# Patient Record
Sex: FEMALE | HISPANIC OR LATINO | Employment: PART TIME | URBAN - METROPOLITAN AREA
[De-identification: names, ages, dates, MRNs, and addresses within clinical notes are randomized per-mention and may not be internally consistent; named-entity substitution may affect disease eponyms.]

---

## 2023-05-30 ENCOUNTER — OFFICE VISIT (OUTPATIENT)
Dept: URGENT CARE | Facility: URGENT CARE | Age: 63
End: 2023-05-30
Payer: COMMERCIAL

## 2023-05-30 VITALS
DIASTOLIC BLOOD PRESSURE: 82 MMHG | RESPIRATION RATE: 20 BRPM | OXYGEN SATURATION: 95 % | HEART RATE: 74 BPM | WEIGHT: 199.9 LBS | TEMPERATURE: 98.7 F | SYSTOLIC BLOOD PRESSURE: 117 MMHG

## 2023-05-30 DIAGNOSIS — H11.422 CHEMOSIS OF LEFT CONJUNCTIVA: Primary | ICD-10-CM

## 2023-05-30 DIAGNOSIS — H02.846 SWELLING OF EYELID, LEFT: ICD-10-CM

## 2023-05-30 PROCEDURE — 99204 OFFICE O/P NEW MOD 45 MIN: CPT | Performed by: PHYSICIAN ASSISTANT

## 2023-05-30 NOTE — PROGRESS NOTES
SUBJECTIVE:  Chief Complaint:   Chief Complaint   Patient presents with     Eye Problem     Left eye swollen drainage woke up with it     History of Present Illness:  Tiffanie Pozo is a 63 year old female who presents complaining of moderate left eye discharge, mattering, pain, redness, edema, eyelid swelling, and chemosis for 1 day(s).   Onset/timing: gradual.    Associated Signs and Symptoms: none      No past medical history on file.  No current outpatient medications on file.        ROS:  Review of systems negative except as stated above.    OBJECTIVE:  /82 (BP Location: Right arm, Patient Position: Sitting, Cuff Size: Adult Large)   Pulse 74   Temp 98.7  F (37.1  C) (Oral)   Resp 20   Wt 90.7 kg (199 lb 14.4 oz)   SpO2 95%   General: no acute distress  Eye exam: right eye normal lid, conjunctiva, cornea, pupil and fundus, left eye abnormal findings: conjunctivitis with erythema, discharge and matting noted, periorbital edema.  Lungs: no labored breathing    No results found for any visits on 05/30/23.      ASSESSMENT:  (H11.422) Chemosis of left conjunctiva  (primary encounter diagnosis)  (H02.846) Swelling of eyelid, left  Comment: pain and significant swelling  Plan: to ED for assessment

## 2023-08-10 ENCOUNTER — OFFICE VISIT (OUTPATIENT)
Dept: URGENT CARE | Facility: URGENT CARE | Age: 63
End: 2023-08-10
Payer: COMMERCIAL

## 2023-08-10 VITALS
RESPIRATION RATE: 20 BRPM | HEART RATE: 78 BPM | OXYGEN SATURATION: 98 % | DIASTOLIC BLOOD PRESSURE: 74 MMHG | TEMPERATURE: 98 F | SYSTOLIC BLOOD PRESSURE: 110 MMHG

## 2023-08-10 DIAGNOSIS — M76.31 ILIOTIBIAL BAND SYNDROME OF RIGHT SIDE: Primary | ICD-10-CM

## 2023-08-10 PROCEDURE — 99213 OFFICE O/P EST LOW 20 MIN: CPT | Performed by: PHYSICIAN ASSISTANT

## 2023-08-10 RX ORDER — METHYLPREDNISOLONE 4 MG
TABLET, DOSE PACK ORAL
Qty: 21 TABLET | Refills: 0 | Status: SHIPPED | OUTPATIENT
Start: 2023-08-10 | End: 2023-11-28

## 2023-08-10 NOTE — PROGRESS NOTES
Assessment & Plan     1. Iliotibial band syndrome of right side  I suspect that patients symptoms are secondary to IT band syndrome, with some pain in her hip flexor as well.  Steroid taper, given the tingling and radiating pain without improvement with OTC medications.   Follow-up with ortho in one week if symptoms are not improving as expected.   - Orthopedic  Referral; Future  - methylPREDNISolone (MEDROL DOSEPAK) 4 MG tablet therapy pack; Follow Package Directions  Dispense: 21 tablet; Refill: 0        Return in about 3 days (around 8/13/2023), or if symptoms worsen or fail to improve.    Diagnosis and treatment plan was reviewed with patient and/or family.   We went over any labs or imaging. Discussed worsening symptoms or little to no relief despite treatment plan to follow-up with PCP or return to clinic.  Patient verbalizes understanding. All questions were addressed and answered.     Lavonne Washington PA-C  [facility] [place]    CHIEF COMPLAINT:   Chief Complaint   Patient presents with    Musculoskeletal Problem     R leg pain no injury pt is having a hard time walking      Luz Moreno is a 63 year old female who presents to clinic today for evaluation of right leg pain. Symptoms started about one  month ago and are slowly worsening. Pain radiates from her hip, down the lateral aspect of her leg. Pain is made worse with standing and walking. She has pain in her hip flexor as well. She has had some tingling down her leg.     Patient denies fever, chills, fatigue, weight loss, fecal or urinary incontinence, lower extremity numbness or tingling, or lower extremity weakness.          History reviewed. No pertinent past medical history.  History reviewed. No pertinent surgical history.  Social History     Tobacco Use    Smoking status: Never    Smokeless tobacco: Never   Substance Use Topics    Alcohol use: Not on file     Current Outpatient Medications   Medication     methylPREDNISolone (MEDROL DOSEPAK) 4 MG tablet therapy pack     No current facility-administered medications for this visit.     No Known Allergies    10 point ROS of systems were all negative except for pertinent positives noted in my HPI.      Exam:   /74   Pulse 78   Temp 98  F (36.7  C) (Tympanic)   Resp 20   SpO2 98%   Gen: healthy,alert,no distress  Extremity: Left leg has tenderness to palpation over the IT band. She does not have TTP over hip, in groin or abdomen. Strength and sensation are intact B/L  There is not compromise to the distal circulation.  Pulses are +2 and CRT is brisk  EXTREMITIES: peripheral pulses normal  SKIN: no suspicious lesions or rashes  NEURO: Normal strength and tone, sensory exam grossly normal, mentation intact and speech normal    No results found for any visits on 08/10/23.

## 2023-08-10 NOTE — PROGRESS NOTES
"Assessment & Plan     There are no diagnoses linked to this encounter.  Not in the location of deep vein.    No follow-ups on file.    Diagnosis and treatment plan was reviewed with patient and/or family.   We went over any labs or imaging. Discussed worsening symptoms or little to no relief despite treatment plan to follow-up with PCP or return to clinic.  Patient verbalizes understanding. All questions were addressed and answered.     KAYLA Sarmiento [facility] [place]    CHIEF COMPLAINT:   Chief Complaint   Patient presents with    Musculoskeletal Problem     R leg pain no injury pt is having a hard time walking      Subjective     Tiffanie is a 63 year old female who presents to clinic today for evaluation of right leg pain from the hip all the way down to the ankle. Feels like a cramp and is more painful with walking. Symptoms have been present for the past month.   She has taken OTC pain medication for her pain.   She has not had any back pain.     She has not noticed any redness or swelling.   No recent travel or surgery.   Patient denies having fever, chills, numbness, pale or cold extremity.       History reviewed. No pertinent past medical history.  History reviewed. No pertinent surgical history.  Social History     Tobacco Use    Smoking status: Never    Smokeless tobacco: Never   Substance Use Topics    Alcohol use: Not on file     No current outpatient medications on file.     No current facility-administered medications for this visit.     No Known Allergies    10 point ROS of systems were all negative except for pertinent positives noted in my HPI.      Exam:   /74   Pulse 78   Temp 98  F (36.7  C) (Tympanic)   Resp 20   SpO2 98%   Gen: {GENERAL APPEARANCE:50::\"healthy,alert,no distress\"}  Extremity: {BODY PART:900730} has {EXAM INJURY:259139}.   She has tenderness at the IT band insertion  There {IS/IS NOT:501992::\"is\"} compromise to the distal circulation.  Pulses " "are +2 and CRT is brisk  {EXAM NORMAL:153291::\"GENERAL APPEARANCE: healthy, alert and no distress\",\"EXTREMITIES: peripheral pulses normal\",\"SKIN: no suspicious lesions or rashes\",\"NEURO: Normal strength and tone, sensory exam grossly normal, mentation intact and speech normal\"}    No results found for any visits on 08/10/23.            "

## 2023-11-03 ENCOUNTER — ANCILLARY PROCEDURE (OUTPATIENT)
Dept: GENERAL RADIOLOGY | Facility: CLINIC | Age: 63
End: 2023-11-03
Attending: PHYSICIAN ASSISTANT
Payer: COMMERCIAL

## 2023-11-03 ENCOUNTER — OFFICE VISIT (OUTPATIENT)
Dept: ORTHOPEDICS | Facility: CLINIC | Age: 63
End: 2023-11-03
Attending: PHYSICIAN ASSISTANT
Payer: COMMERCIAL

## 2023-11-03 VITALS — WEIGHT: 216.49 LBS

## 2023-11-03 DIAGNOSIS — M16.11 PRIMARY OSTEOARTHRITIS OF RIGHT HIP: Primary | ICD-10-CM

## 2023-11-03 DIAGNOSIS — M79.604 RIGHT LEG PAIN: ICD-10-CM

## 2023-11-03 DIAGNOSIS — M76.31 ILIOTIBIAL BAND SYNDROME OF RIGHT SIDE: ICD-10-CM

## 2023-11-03 PROCEDURE — 73502 X-RAY EXAM HIP UNI 2-3 VIEWS: CPT | Mod: TC | Performed by: RADIOLOGY

## 2023-11-03 PROCEDURE — 99205 OFFICE O/P NEW HI 60 MIN: CPT | Performed by: PHYSICIAN ASSISTANT

## 2023-11-03 PROCEDURE — 72100 X-RAY EXAM L-S SPINE 2/3 VWS: CPT | Mod: TC | Performed by: RADIOLOGY

## 2023-11-03 RX ORDER — MELOXICAM 7.5 MG/1
15 TABLET ORAL 2 TIMES DAILY
Qty: 30 TABLET | Refills: 0 | Status: SHIPPED | OUTPATIENT
Start: 2023-11-03 | End: 2023-11-25 | Stop reason: SINTOL

## 2023-11-03 NOTE — PATIENT INSTRUCTIONS
Today we discussed the underlying etiology/pathology of patient's   1. Primary osteoarthritis of right hip    2. Right leg pain      -At this time I had a michael discussion with the patient through  that her physical exam and history are difficult to interpret.  - Patient does have degenerative changes of the right hip on x-ray with pincer type anatomy and does have discomfort with internal rotation and impingement testing generating right groin pain as well as some right proximal hemipelvic pain.  - Patient also is tender to palpation through the greater trochanter and the IT band laterally  - I cannot explain patient's pulsating statements involving the right lateral calf and cannot rule out lumbar radiculopathy as a possible differential diagnosis  - For diagnostic and therapeutic purposes I will send her for intra-articular cortisone injection of the right hip under advanced imaging.  Patient was instructed to be attention to the anesthetic portion for the first 6 to 12 hours as well as the cortisone effect over the next 2 weeks status post injection.  I will see her back 2 weeks after injection to determine therapeutic response  - Meloxicam was refilled today.  If patient does not get any relief from intra-articular hip injection we will need to pursue consideration for lumbar radiculopathy.    -Call direct clinic number [[phone]] at any time with questions or concerns in regards to your recent office visit with me.     Giovanni North PA-C  [facility]

## 2023-11-03 NOTE — LETTER
11/3/2023         RE: Tiffanie Pozo  [address]  [place] [address]        Dear Colleague,    Thank you for referring your patient, Tiffanie Pozo, to the [facility] [place]. Please see a copy of my visit note below.    ASSESSMENT & PLAN       Today we discussed the underlying etiology/pathology of patient's   1. Primary osteoarthritis of right hip    2. Right leg pain      -At this time I had a michael discussion with the patient through  that her physical exam and history are difficult to interpret.  - Patient does have degenerative changes of the right hip on x-ray with pincer type anatomy and does have discomfort with internal rotation and impingement testing generating right groin pain as well as some right proximal hemipelvic pain.  - Patient also is tender to palpation through the greater trochanter and the IT band laterally  - I cannot explain patient's pulsating statements involving the right lateral calf and cannot rule out lumbar radiculopathy as a possible differential diagnosis  - For diagnostic and therapeutic purposes I will send her for intra-articular cortisone injection of the right hip under advanced imaging.  Patient was instructed to be attention to the anesthetic portion for the first 6 to 12 hours as well as the cortisone effect over the next 2 weeks status post injection.  I will see her back 2 weeks after injection to determine therapeutic response  - Meloxicam was refilled today.  If patient does not get any relief from intra-articular hip injection we will need to pursue consideration for lumbar radiculopathy.    -Call direct clinic number [[phone]] at any time with questions or concerns in regards to your recent office visit with me.     Givoanni North PA-C  [facility]        SUBJECTIVE  Tiffanie Pozo is a/an 63 year old female who is seen in consultation at the request of  Lavonne Washington PA-C  for evaluation of right IT band pain. The patient is seen with their daughter.    Expanded HPI: Patient has had symptoms for at least 4 months.  She was seen back in August and diagnosed with IT band tendinitis.  She was placed on a Medrol Dosepak.  She states that while she took the medication she had mild improvement.  Her pain continues to be hemipelvic involving mainly the right groin right anterior and lateral thigh with symptoms also involving the lateral aspect of the calf.  Patient was prescribed meloxicam recently while she was in [place].  She states that this medicine does help but her pain comes back in the evening or night.  She is currently taking 7.5 mg tablets.  She denies any symptoms on the contralateral side.  She denies any significant back pain.  She denies any saddle anesthesia or loss of bowel or bladder control.  She describes her symptoms as pulsating largely involving the proximal lateral calf region on the right side.  She has no significant symptoms well upright or walking.  Symptoms seem to be aggravated by prolonged seated activities or laying down.      Onset: 4 month(s) ago. Reports insidious onset without acute precipitating event. Worsening each day. Most recent fall is one year ago.   Location of Pain: right IT band - groin pain radiating to the feet, feels stiff and pulling; no low back pain  Rating of Pain at worst: 10/10  Rating of Pain Currently: 9/10  Worsened by: worst in the morning when waking  Better with: mild with treatments tried, pain comes right back  Treatments tried: Tylenol and ibuprofen, ice, heat, massage therapy, previous Medrol Dosepak in August 2023 and currently on meloxicam  Quality: sharp, stabbing, throbbing  Associated symptoms: weakness of the leg and numbness in the calf area, feeling of instability   Orthopedic history: NO  Relevant surgical history: NO  Social history: social history: sometimes works; no other recreation    No past medical history on  file.  Social History     Socioeconomic History     Marital status:    Tobacco Use     Smoking status: Never     Smokeless tobacco: Never   Vaping Use     Vaping Use: Never used         Patient's past medical, surgical, social, and family histories were personally reviewed today and no changes are noted.    REVIEW OF SYSTEMS:  10 point ROS is negative other than symptoms noted above in HPI, Past Medical History or as stated below  Constitutional: NEGATIVE for fever, chills, change in weight  Skin: NEGATIVE for worrisome rashes, moles or lesions  GI/: NEGATIVE for bowel or bladder changes  Neuro: NEGATIVE for weakness, dizziness or paresthesias    OBJECTIVE:  There were no vitals taken for this visit.   General: healthy, alert and in no distress  HEENT: no scleral icterus or conjunctival erythema  Skin: no suspicious lesions or rash. No jaundice.  CV: no pedal edema  Resp: normal respiratory effort without conversational dyspnea   Psych: normal mood and affect  Gait: normal steady gait with appropriate coordination and balance  Neuro: Normal light sensory exam of lower extremity      MSK:  Exam shows a pleasant 63-year-old female who ambulates full weightbearing without assistive device.  No antalgia.  Negative Trendelenburg gait.  Patient can forward flex her fingertips to approximately mid tibia level with some mild pulling in the right low back.  Lumbar extension is within normal limits without significant symptoms.  Slight increased lumbar lordosis noted.  In a seated position internal rotation is slightly diminished on the right side which patient states reproduces her groin pain and some thigh pain.  She denies any symptoms below the knee with rotation.  In a supine position patient has mildly positive impingement testing of the right hip again patient states that this reproduces her pain in the groin and hemipelvis.  Patient is tender to palpation through the greater trochanter as well as the IT band  to the level of the knee.  Straight leg raise shows decreased flexibility of the hamstrings on the right compared to the left.  Tension signs are very difficult for the patient to state if they are positive or negative involving the lower extremity down to the foot.  No symptoms are noted on the left side with impingement testing or rotation of the hip.  No tenderness on the trochanter or IT band on the left.  Patient shows no tenderness to palpation throughout the lumbar spine including the spinous processes.  No pain over the SI joints.  No lower extremity edema.  Calves are soft and supple.        Independent visualization of the below image:  2 view x-ray of the pelvis and right hip and 3 view x-rays of the patient's lumbar spine are obtained and reviewed today.  There is degenerative changes of the right hip with pincer type GARFIELD anatomy.  No evidence of AVN or collapse.  No bone-on-bone contact.  Lumbar spine x-rays show disc heights maintained.  Slight calcific change noted of the abdominal aorta in front of the L4 vertebral body.  Increased density is noted of the facet joints of the lumbar column from L3-S1 consistent with spondylosis.  No evidence of fracture or dislocation.  AP view shows slight scoliotic rotation/curvature of the lumbar spine.  SI joints are largely unremarkable.  No evidence of lytic lesions.    Patient's conditions were thoroughly discussed during today's visit with total time spent face-to-face with the patient and documentation being 65 minutes.    Giovanni North PA-C  [facility]    This note was completed in part using a voice recognition software, any grammatical or context distortion are unintentional and inherent to the software.       Again, thank you for allowing me to participate in the care of your patient.        Sincerely,        Giovanni North PA-C

## 2023-11-03 NOTE — PROGRESS NOTES
ASSESSMENT & PLAN       Today we discussed the underlying etiology/pathology of patient's   1. Primary osteoarthritis of right hip    2. Right leg pain      -At this time I had a michael discussion with the patient through  that her physical exam and history are difficult to interpret.  - Patient does have degenerative changes of the right hip on x-ray with pincer type anatomy and does have discomfort with internal rotation and impingement testing generating right groin pain as well as some right proximal hemipelvic pain.  - Patient also is tender to palpation through the greater trochanter and the IT band laterally  - I cannot explain patient's pulsating statements involving the right lateral calf and cannot rule out lumbar radiculopathy as a possible differential diagnosis  - For diagnostic and therapeutic purposes I will send her for intra-articular cortisone injection of the right hip under advanced imaging.  Patient was instructed to be attention to the anesthetic portion for the first 6 to 12 hours as well as the cortisone effect over the next 2 weeks status post injection.  I will see her back 2 weeks after injection to determine therapeutic response  - Meloxicam was refilled today.  If patient does not get any relief from intra-articular hip injection we will need to pursue consideration for lumbar radiculopathy.    -Call direct clinic number [[phone]] at any time with questions or concerns in regards to your recent office visit with me.     Giovanni North PA-C  [facility]        SUBJECTIVE  Tiffanie Pozo is a/an 63 year old female who is seen in consultation at the request of  Lavonne Washington PA-C for evaluation of right IT band pain. The patient is seen with their daughter.    Expanded HPI: Patient has had symptoms for at least 4 months.  She was seen back in August and diagnosed with IT band tendinitis.  She was placed on a Medrol Dosepak.  She states that while  she took the medication she had mild improvement.  Her pain continues to be hemipelvic involving mainly the right groin right anterior and lateral thigh with symptoms also involving the lateral aspect of the calf.  Patient was prescribed meloxicam recently while she was in [place].  She states that this medicine does help but her pain comes back in the evening or night.  She is currently taking 7.5 mg tablets.  She denies any symptoms on the contralateral side.  She denies any significant back pain.  She denies any saddle anesthesia or loss of bowel or bladder control.  She describes her symptoms as pulsating largely involving the proximal lateral calf region on the right side.  She has no significant symptoms well upright or walking.  Symptoms seem to be aggravated by prolonged seated activities or laying down.      Onset: 4 month(s) ago. Reports insidious onset without acute precipitating event. Worsening each day. Most recent fall is one year ago.   Location of Pain: right IT band - groin pain radiating to the feet, feels stiff and pulling; no low back pain  Rating of Pain at worst: 10/10  Rating of Pain Currently: 9/10  Worsened by: worst in the morning when waking  Better with: mild with treatments tried, pain comes right back  Treatments tried: Tylenol and ibuprofen, ice, heat, massage therapy, previous Medrol Dosepak in August 2023 and currently on meloxicam  Quality: sharp, stabbing, throbbing  Associated symptoms: weakness of the leg and numbness in the calf area, feeling of instability   Orthopedic history: NO  Relevant surgical history: NO  Social history: social history: sometimes works; no other recreation    No past medical history on file.  Social History     Socioeconomic History    Marital status:    Tobacco Use    Smoking status: Never    Smokeless tobacco: Never   Vaping Use    Vaping Use: Never used         Patient's past medical, surgical, social, and family histories were personally  reviewed today and no changes are noted.    REVIEW OF SYSTEMS:  10 point ROS is negative other than symptoms noted above in HPI, Past Medical History or as stated below  Constitutional: NEGATIVE for fever, chills, change in weight  Skin: NEGATIVE for worrisome rashes, moles or lesions  GI/: NEGATIVE for bowel or bladder changes  Neuro: NEGATIVE for weakness, dizziness or paresthesias    OBJECTIVE:  There were no vitals taken for this visit.   General: healthy, alert and in no distress  HEENT: no scleral icterus or conjunctival erythema  Skin: no suspicious lesions or rash. No jaundice.  CV: no pedal edema  Resp: normal respiratory effort without conversational dyspnea   Psych: normal mood and affect  Gait: normal steady gait with appropriate coordination and balance  Neuro: Normal light sensory exam of lower extremity      MSK:  Exam shows a pleasant 63-year-old female who ambulates full weightbearing without assistive device.  No antalgia.  Negative Trendelenburg gait.  Patient can forward flex her fingertips to approximately mid tibia level with some mild pulling in the right low back.  Lumbar extension is within normal limits without significant symptoms.  Slight increased lumbar lordosis noted.  In a seated position internal rotation is slightly diminished on the right side which patient states reproduces her groin pain and some thigh pain.  She denies any symptoms below the knee with rotation.  In a supine position patient has mildly positive impingement testing of the right hip again patient states that this reproduces her pain in the groin and hemipelvis.  Patient is tender to palpation through the greater trochanter as well as the IT band to the level of the knee.  Straight leg raise shows decreased flexibility of the hamstrings on the right compared to the left.  Tension signs are very difficult for the patient to state if they are positive or negative involving the lower extremity down to the foot.  No  symptoms are noted on the left side with impingement testing or rotation of the hip.  No tenderness on the trochanter or IT band on the left.  Patient shows no tenderness to palpation throughout the lumbar spine including the spinous processes.  No pain over the SI joints.  No lower extremity edema.  Calves are soft and supple.        Independent visualization of the below image:  2 view x-ray of the pelvis and right hip and 3 view x-rays of the patient's lumbar spine are obtained and reviewed today.  There is degenerative changes of the right hip with pincer type GARFIELD anatomy.  No evidence of AVN or collapse.  No bone-on-bone contact.  Lumbar spine x-rays show disc heights maintained.  Slight calcific change noted of the abdominal aorta in front of the L4 vertebral body.  Increased density is noted of the facet joints of the lumbar column from L3-S1 consistent with spondylosis.  No evidence of fracture or dislocation.  AP view shows slight scoliotic rotation/curvature of the lumbar spine.  SI joints are largely unremarkable.  No evidence of lytic lesions.    Patient's conditions were thoroughly discussed during today's visit with total time spent face-to-face with the patient and documentation being 65 minutes.    Giovanni North PA-C  [facility]    This note was completed in part using a voice recognition software, any grammatical or context distortion are unintentional and inherent to the software.

## 2023-11-22 ENCOUNTER — HOSPITAL ENCOUNTER (OUTPATIENT)
Dept: GENERAL RADIOLOGY | Facility: CLINIC | Age: 63
Discharge: HOME | End: 2023-11-22
Attending: PHYSICIAN ASSISTANT | Admitting: PHYSICIAN ASSISTANT
Payer: COMMERCIAL

## 2023-11-22 VITALS
DIASTOLIC BLOOD PRESSURE: 78 MMHG | HEART RATE: 78 BPM | OXYGEN SATURATION: 98 % | RESPIRATION RATE: 16 BRPM | SYSTOLIC BLOOD PRESSURE: 120 MMHG

## 2023-11-22 DIAGNOSIS — M16.11 PRIMARY OSTEOARTHRITIS OF RIGHT HIP: ICD-10-CM

## 2023-11-22 PROCEDURE — 250N000011 HC RX IP 250 OP 636: Mod: JZ | Performed by: PHYSICIAN ASSISTANT

## 2023-11-22 PROCEDURE — 250N000009 HC RX 250: Performed by: PHYSICIAN ASSISTANT

## 2023-11-22 PROCEDURE — 255N000002 HC RX 255 OP 636: Mod: JZ | Performed by: PHYSICIAN ASSISTANT

## 2023-11-22 PROCEDURE — 77002 NEEDLE LOCALIZATION BY XRAY: CPT

## 2023-11-22 RX ORDER — BUPIVACAINE HYDROCHLORIDE 5 MG/ML
INJECTION, SOLUTION EPIDURAL; INTRACAUDAL
Status: DISCONTINUED
Start: 2023-11-22 | End: 2023-11-23 | Stop reason: HOSPADM

## 2023-11-22 RX ORDER — BUPIVACAINE HYDROCHLORIDE 5 MG/ML
5 INJECTION, SOLUTION EPIDURAL; INTRACAUDAL ONCE
Status: COMPLETED | OUTPATIENT
Start: 2023-11-22 | End: 2023-11-22

## 2023-11-22 RX ORDER — TRIAMCINOLONE ACETONIDE 40 MG/ML
INJECTION, SUSPENSION INTRA-ARTICULAR; INTRAMUSCULAR
Status: DISCONTINUED
Start: 2023-11-22 | End: 2023-11-23 | Stop reason: HOSPADM

## 2023-11-22 RX ORDER — LIDOCAINE HYDROCHLORIDE 10 MG/ML
5 INJECTION, SOLUTION EPIDURAL; INFILTRATION; INTRACAUDAL; PERINEURAL ONCE
Status: COMPLETED | OUTPATIENT
Start: 2023-11-22 | End: 2023-11-22

## 2023-11-22 RX ORDER — LIDOCAINE HYDROCHLORIDE 10 MG/ML
INJECTION, SOLUTION EPIDURAL; INFILTRATION; INTRACAUDAL; PERINEURAL
Status: DISCONTINUED
Start: 2023-11-22 | End: 2023-11-23 | Stop reason: HOSPADM

## 2023-11-22 RX ORDER — TRIAMCINOLONE ACETONIDE 40 MG/ML
40 INJECTION, SUSPENSION INTRA-ARTICULAR; INTRAMUSCULAR ONCE
Status: COMPLETED | OUTPATIENT
Start: 2023-11-22 | End: 2023-11-22

## 2023-11-22 RX ORDER — IOPAMIDOL 408 MG/ML
10 INJECTION, SOLUTION INTRATHECAL ONCE
Status: COMPLETED | OUTPATIENT
Start: 2023-11-22 | End: 2023-11-22

## 2023-11-22 RX ADMIN — IOPAMIDOL 3.5 ML: 408 INJECTION, SOLUTION INTRATHECAL at 16:13

## 2023-11-22 RX ADMIN — BUPIVACAINE HYDROCHLORIDE 20 MG: 5 INJECTION, SOLUTION EPIDURAL; INTRACAUDAL at 16:09

## 2023-11-22 RX ADMIN — LIDOCAINE HYDROCHLORIDE 4 ML: 10 INJECTION, SOLUTION EPIDURAL; INFILTRATION; INTRACAUDAL; PERINEURAL at 16:08

## 2023-11-22 RX ADMIN — TRIAMCINOLONE ACETONIDE 40 MG: 40 INJECTION, SUSPENSION INTRA-ARTICULAR; INTRAMUSCULAR at 16:13

## 2023-11-22 NOTE — PROGRESS NOTES
Patient tolerated right hip steroid injection well by Andre HARGROVE.  Site Clean Dry and intact, dressing applied with no drainage.  Patient rated pre procedural pain at 8/10 and post pain at 0/10.  Patient home per family, understands and oral instructions via translation.    Mohinder Garcia, RN, BSN

## 2023-11-22 NOTE — PROGRESS NOTES
RADIOLOGY PROCEDURE NOTE  Patient name: Tiffanie Pozo  MRN: [id number]  : 1960    Pre-procedure diagnosis: Right hip pain and OA  Post-procedure diagnosis: Same    Procedure Date/Time: 2023  4:18 PM  Procedure: Right hip steroid injection  Estimated blood loss: None  Specimen(s) collected with description: none  The patient tolerated the procedure well with no immediate complications.  Significant findings:none    See imaging dictation for procedural details.    Provider name: Andre Joyce PA-C  Assistant(s):None

## 2023-11-25 ENCOUNTER — OFFICE VISIT (OUTPATIENT)
Dept: URGENT CARE | Facility: URGENT CARE | Age: 63
End: 2023-11-25
Payer: COMMERCIAL

## 2023-11-25 VITALS
WEIGHT: 187.39 LBS | DIASTOLIC BLOOD PRESSURE: 81 MMHG | RESPIRATION RATE: 16 BRPM | TEMPERATURE: 98.5 F | SYSTOLIC BLOOD PRESSURE: 120 MMHG | HEART RATE: 73 BPM | OXYGEN SATURATION: 98 %

## 2023-11-25 DIAGNOSIS — K92.1 MELENA: Primary | ICD-10-CM

## 2023-11-25 DIAGNOSIS — R10.13 EPIGASTRIC PAIN: ICD-10-CM

## 2023-11-25 DIAGNOSIS — M17.11 PRIMARY OSTEOARTHRITIS OF RIGHT KNEE: ICD-10-CM

## 2023-11-25 LAB
ERYTHROCYTE [DISTWIDTH] IN BLOOD BY AUTOMATED COUNT: 14.3 % (ref 10–15)
HCT VFR BLD AUTO: 31.2 % (ref 35–47)
HGB BLD-MCNC: 9.9 G/DL (ref 11.7–15.7)
MCH RBC QN AUTO: 29.7 PG (ref 26.5–33)
MCHC RBC AUTO-ENTMCNC: 31.7 G/DL (ref 31.5–36.5)
MCV RBC AUTO: 94 FL (ref 78–100)
PLATELET # BLD AUTO: 254 10E3/UL (ref 150–450)
RBC # BLD AUTO: 3.33 10E6/UL (ref 3.8–5.2)
WBC # BLD AUTO: 11.4 10E3/UL (ref 4–11)

## 2023-11-25 PROCEDURE — 85027 COMPLETE CBC AUTOMATED: CPT | Performed by: INTERNAL MEDICINE

## 2023-11-25 PROCEDURE — 99204 OFFICE O/P NEW MOD 45 MIN: CPT | Performed by: INTERNAL MEDICINE

## 2023-11-25 PROCEDURE — 36415 COLL VENOUS BLD VENIPUNCTURE: CPT | Performed by: INTERNAL MEDICINE

## 2023-11-25 RX ORDER — PANTOPRAZOLE SODIUM 40 MG/1
TABLET, DELAYED RELEASE ORAL
Qty: 42 TABLET | Refills: 0 | Status: SHIPPED | OUTPATIENT
Start: 2023-11-25 | End: 2023-12-13

## 2023-11-25 NOTE — PROGRESS NOTES
Assessment & Plan     Melena  Clinically most compatible with upper GI source of bleeding with CBC showing significant drop in hemoglobin but hemodynamically stable and taking po without problems.  Will pursue initial course of ambulatory management with PPI therapy and outpatient follow-up.  Patient told to go to ER if she develops lightheadedness, pre-syncope, tachycardia, palpitations, vomiting, worsening pain.  At her outpatient follow-up she should be referred to GI for upper endoscopy.  Suspect NSAID-induced gastric or duodenal ulcer but this should be confirmed endoscopically. Stop NSAIDs and initiate PPI.      - CBC with platelets; Future  - CBC with platelets  - pantoprazole (PROTONIX) 40 MG EC tablet; Take 1 tablet (40 mg) by mouth 2 times daily for 14 days, THEN 1 tablet (40 mg) daily for 14 days.    Epigastric pain  - CBC with platelets; Future  - CBC with platelets  - pantoprazole (PROTONIX) 40 MG EC tablet; Take 1 tablet (40 mg) by mouth 2 times daily for 14 days, THEN 1 tablet (40 mg) daily for 14 days.    Primary osteoarthritis of right knee  Stop systemic NSAID.  Start topical NSAID and acetaminophen.  - diclofenac (VOLTAREN) 1 % topical gel; Apply 4 g topically 4 times daily as needed for moderate pain    Dillan Adair MD  [facility] LUIS Moreno is a 63 year old, presenting for the following health issues:  Urgent Care (Patient states she has blood in her stool x few days with pain and diarrhea. Patient states she is dizzy and nauseous x 2 days )    HPI   Chief complaint of abdominal pain.  This has been present for several days.  Noting increase in nausea, watery stool and stool frequency two to three times.  Noting some blood in the stool.  The stool that is passing is black.  Abdominal pain is across the middle of the stomach and into the epigastric region.  Feels crampy. The pain is coming and going.  Pain is not clearly worse with eating.  Is nauseated  but not vomiting. Also noting some arthritis pain.     CMED: meloxicam (has been on this for the past month or so), methylprednisolone injection, naproxen (occasional)    Review of Systems   Constitutional, HEENT, cardiovascular, pulmonary, gi and gu systems are negative, except as otherwise noted.      Objective    /81   Pulse 73   Temp 98.5  F (36.9  C) (Tympanic)   Resp 16   Wt 85 kg (187 lb 6.3 oz)   SpO2 98%   There is no height or weight on file to calculate BMI.  Physical Exam   GENERAL APPEARANCE: alert and no distress  RESP: lungs clear to auscultation - no rales, rhonchi or wheezes  CV: regular rates and rhythm, normal S1 S2, no S3 or S4, and no murmur, click or rub  ABDOMEN: soft, non-distended, mild generalized tenderness without rebound or guarding; most notable tenderness in the epigastric region; negative psoas and obturator signs; negative Carnett's    Results for orders placed or performed in visit on 11/25/23 (from the past 24 hour(s))   CBC with platelets   Result Value Ref Range    WBC Count 11.4 (H) 4.0 - 11.0 10e3/uL    RBC Count 3.33 (L) 3.80 - 5.20 10e6/uL    Hemoglobin 9.9 (L) 11.7 - 15.7 g/dL    Hematocrit 31.2 (L) 35.0 - 47.0 %    MCV 94 78 - 100 fL    MCH 29.7 26.5 - 33.0 pg    MCHC 31.7 31.5 - 36.5 g/dL    RDW 14.3 10.0 - 15.0 %    Platelet Count 254 150 - 450 10e3/uL     Prior hemoglobin = 14.0 in May 2023

## 2023-11-28 ENCOUNTER — OFFICE VISIT (OUTPATIENT)
Dept: ORTHOPEDICS | Facility: CLINIC | Age: 63
End: 2023-11-28
Payer: COMMERCIAL

## 2023-11-28 DIAGNOSIS — G89.29 CHRONIC RADICULAR LOW BACK PAIN: Primary | ICD-10-CM

## 2023-11-28 DIAGNOSIS — M16.11 PRIMARY OSTEOARTHRITIS OF RIGHT HIP: ICD-10-CM

## 2023-11-28 DIAGNOSIS — M54.16 CHRONIC RADICULAR LOW BACK PAIN: Primary | ICD-10-CM

## 2023-11-28 PROCEDURE — 99417 PROLNG OP E/M EACH 15 MIN: CPT | Performed by: PHYSICIAN ASSISTANT

## 2023-11-28 PROCEDURE — 99215 OFFICE O/P EST HI 40 MIN: CPT | Performed by: PHYSICIAN ASSISTANT

## 2023-11-28 RX ORDER — GABAPENTIN 300 MG/1
300 CAPSULE ORAL 2 TIMES DAILY
Qty: 60 CAPSULE | Refills: 0 | Status: SHIPPED | OUTPATIENT
Start: 2023-11-28 | End: 2023-12-26

## 2023-11-28 NOTE — PATIENT INSTRUCTIONS
Today we discussed the underlying etiology/pathology of patient's   1. Chronic radicular right low back pain    2. Primary osteoarthritis of right hip- improved with intra-articular cortisone injection 11/22/23      -We discussed that patient's intra-articular right hip injection done on 11/22/2023 has eliminated her right hemipelvic pain.  Pre-existing pain level is rated an 8 out of 10 and currently she rates her pain a 0 out of 10 in regards to anterior groin or any proximal hemipelvic pain.  - Patient was recently seen in urgent care and diagnosed with possible upper GI bleed from prolonged use of NSAID.  Patient has stopped meloxicam.  Patient was instructed to follow-up/establish with primary care for assessment of this with possible repeat blood work and possible referral to GI for upper endoscopy.  This was reinforced again to the patient today  - Patient now continues to have discomfort over the anterior lateral right thigh with radiation into the right shin and right foot more consistent with an L5 dermatome pattern.  This has been present for 4 months.  Physical exam reinforces likely lumbar radiculopathy.  Previous lumbar x-rays are largely unremarkable.  - Patient did not respond to oral methylprednisone burst pack long-term for her current right leg symptom.  We will start her on gabapentin 300 mg tablet to be taken 1 tablet every 12 hours.  We discussed potential side effects including drowsiness.  If patient cannot tolerate morning dose she will focus on 1 tablet before bedtime.  - Patient will be referred to physical therapy for low back program for right leg radiculopathy with possible use of traction.  I would like to see her back after 6 weeks of physical therapy.  If right leg symptoms are persistent or worsen we will need to proceed with lumbar MRI to evaluate for spinal stenosis.  No evidence of myelopathic changes at this time.  - Patient to avoid NSAIDs.    -Call direct clinic number  [[phone]] at any time with questions or concerns in regards to your recent office visit with me.     Giovanni North PA-C  [facility]

## 2023-11-28 NOTE — LETTER
11/28/2023         RE: Tiffanie Pozo  [address]  [place] [address]        Dear Colleague,    Thank you for referring your patient, Tiffanie Pozo, to the [facility] [place]. Please see a copy of my visit note below.    ASSESSMENT & PLAN         Today we discussed the underlying etiology/pathology of patient's   1. Chronic radicular right low back pain    2. Primary osteoarthritis of right hip- improved with intra-articular cortisone injection 11/22/23      -We discussed that patient's intra-articular right hip injection done on 11/22/2023 has eliminated her right hemipelvic pain.  Pre-existing pain level is rated an 8 out of 10 and currently she rates her pain a 0 out of 10 in regards to anterior groin or any proximal hemipelvic pain.  - Patient was recently seen in urgent care and diagnosed with possible upper GI bleed from prolonged use of NSAID.  Patient has stopped meloxicam.  Patient was instructed to follow-up/establish with primary care for assessment of this with possible repeat blood work and possible referral to GI for upper endoscopy.  This was reinforced again to the patient today  - Patient now continues to have discomfort over the anterior lateral right thigh with radiation into the right shin and right foot more consistent with an L5 dermatome pattern.  This has been present for 4 months.  Physical exam reinforces likely lumbar radiculopathy.  Previous lumbar x-rays are largely unremarkable.  - Patient did not respond to oral methylprednisone burst pack long-term for her current right leg symptom.  We will start her on gabapentin 300 mg tablet to be taken 1 tablet every 12 hours.  We discussed potential side effects including drowsiness.  If patient cannot tolerate morning dose she will focus on 1 tablet before bedtime.  - Patient will be referred to physical therapy for low back program for right leg radiculopathy with possible use of traction.  I  would like to see her back after 6 weeks of physical therapy.  If right leg symptoms are persistent or worsen we will need to proceed with lumbar MRI to evaluate for spinal stenosis.  No evidence of myelopathic changes at this time.  - Patient to avoid NSAIDs.    -Call direct clinic number [[phone]] at any time with questions or concerns in regards to your recent office visit with me.     Giovanni North PA-C  [facility]      SUBJECTIVE  Tiffanie Pozo is a/an 63 year old female who is seen for follow up of right hip and leg pain. The patient is seen by themselves.  Patient was sent for right hip intra-articular cortisone injection on 11/22/2023 for diagnostic/therapeutic purposes.  She rated her right hemipelvic pain a 8 out of 10 prior to injection and currently rates her pain a 0 out of 10 in regards to right groin/hemipelvic pain.  She continues to have symptoms involving the right lateral thigh in the anterior right shin into the anterior dorsal aspect of the right foot.  No symptoms on the left side.  She states that she has absolutely no symptoms while ambulating but symptoms are noted with prolonged seated activities.  She denies any saddle anesthesia.  She describes her symptoms of the lower extremity on the right side as numbness and pulsating.   Since last visit on 11/3/23 patient developed some darkening of her stool with epigastric pain.  She was seen in urgent care and diagnosed with possible upper GI bleed.  Patient has not followed up as recommended with established family practice or GI provider.  She did discontinue meloxicam.      Has previous treatment plan been beneficial for condition: Yes for eliminating right hemipelvic pain but she continues to have persistent right lateral thigh and right anterior shin/foot numbness/discomfort  Location of Pain: right lateral thigh and lower leg   Rating of Pain at worst: 9/10 - morning  Rating of Pain Currently: 7/10 -  intermittent  Worsened by: flexing the knee for long periods, pain is worse in the morning  Better with: lifting the leg/extending  Treatments tried: ice, heat, image guided right hip intra-articular corticosteroid injection (most recent date: 11/22/23) that provided several day(s) of relief, and Voltaren Topical, ACE wrap on the calf, compression stockings   Quality: intermittent aching, dull, fullness of the calf  Associated symptoms: weakness of the leg with walking and feeling of instability      No past medical history on file.  Social History     Socioeconomic History     Marital status:    Tobacco Use     Smoking status: Never     Smokeless tobacco: Never   Vaping Use     Vaping Use: Never used         Patient's past medical, surgical, social, and family histories were personally reviewed today and no changes are noted.  REVIEW OF SYSTEMS:  Review of Systems    OBJECTIVE:  There were no vitals taken for this visit.   General: healthy, alert and in no distress  HEENT: no scleral icterus or conjunctival erythema  Skin: no suspicious lesions or rash. No jaundice.  CV: no pedal edema  Resp: normal respiratory effort without conversational dyspnea   Psych: normal mood and affect  Gait: normal steady gait with appropriate coordination and balance  Neuro: Normal light sensory exam of lower extremity      MSK:  Exam shows a 63-year-old female who presents alone.  Online  is used for communication.  25 minutes of dialogue today just obtaining response to intra-articular right hip cortisone injection and current pain complaints.  Examination of the right lower extremity shows no bruising, no swelling and no ecchymosis.  She is completely nontender to firm palpation throughout the entire gastrocsoleus complex including the ankle and foot on the right side.  No pain along the medial or lateral joint line of the knee.  She has full knee extension and flexion within normal limits at the knee.  She prefers  to hold her heel off the ground keeping her right knee fully extended for symptomatic relief.  Patient states that she has decreased sensation in the L5 dermatome on the right foot compared to the left at the inner webspace between the first and second digits.  Patient has full active and passive range of motion of the right hip without significant discomfort.  Slight discomfort to palpation from the right hip greater trochanter along the IT band of the right thigh.  Reproducible positive straight leg raise on the right causing radicular pain pattern along the L5 distribution which is negative on the left.  BOONE testing negative.  FADIR test negative.  Lumbar motion shows she forward flexes bringing her fingertips to proximal and mid should level causing right-sided low back pain as well as right leg radicular findings.  Lumbar extension does not generate pain.  Lateral bending to the right is mildly positive for right-sided low back pain and negative to the left.  Rotation does not cause discomfort.        Patient's conditions were thoroughly discussed during today's visit with total time spent face-to-face with the patient and documentation being 70 minutes.    Giovanni North PA-C  [facility]    This note was completed in part using a voice recognition software, any grammatical or context distortion are unintentional and inherent to the software.       Again, thank you for allowing me to participate in the care of your patient.        Sincerely,        Giovanni North PA-C

## 2023-11-28 NOTE — PROGRESS NOTES
ASSESSMENT & PLAN         Today we discussed the underlying etiology/pathology of patient's   1. Chronic radicular right low back pain    2. Primary osteoarthritis of right hip- improved with intra-articular cortisone injection 11/22/23      -We discussed that patient's intra-articular right hip injection done on 11/22/2023 has eliminated her right hemipelvic pain.  Pre-existing pain level is rated an 8 out of 10 and currently she rates her pain a 0 out of 10 in regards to anterior groin or any proximal hemipelvic pain.  - Patient was recently seen in urgent care and diagnosed with possible upper GI bleed from prolonged use of NSAID.  Patient has stopped meloxicam.  Patient was instructed to follow-up/establish with primary care for assessment of this with possible repeat blood work and possible referral to GI for upper endoscopy.  This was reinforced again to the patient today  - Patient now continues to have discomfort over the anterior lateral right thigh with radiation into the right shin and right foot more consistent with an L5 dermatome pattern.  This has been present for 4 months.  Physical exam reinforces likely lumbar radiculopathy.  Previous lumbar x-rays are largely unremarkable.  - Patient did not respond to oral methylprednisone burst pack long-term for her current right leg symptom.  We will start her on gabapentin 300 mg tablet to be taken 1 tablet every 12 hours.  We discussed potential side effects including drowsiness.  If patient cannot tolerate morning dose she will focus on 1 tablet before bedtime.  - Patient will be referred to physical therapy for low back program for right leg radiculopathy with possible use of traction.  I would like to see her back after 6 weeks of physical therapy.  If right leg symptoms are persistent or worsen we will need to proceed with lumbar MRI to evaluate for spinal stenosis.  No evidence of myelopathic changes at this time.  - Patient to avoid NSAIDs.    -Call  direct clinic number [[phone]] at any time with questions or concerns in regards to your recent office visit with me.     Giovanni North PA-C  [facility]      SUBJECTIVE  Tiffanie Pozo is a/an 63 year old female who is seen for follow up of right hip and leg pain. The patient is seen by themselves.  Patient was sent for right hip intra-articular cortisone injection on 11/22/2023 for diagnostic/therapeutic purposes.  She rated her right hemipelvic pain a 8 out of 10 prior to injection and currently rates her pain a 0 out of 10 in regards to right groin/hemipelvic pain.  She continues to have symptoms involving the right lateral thigh in the anterior right shin into the anterior dorsal aspect of the right foot.  No symptoms on the left side.  She states that she has absolutely no symptoms while ambulating but symptoms are noted with prolonged seated activities.  She denies any saddle anesthesia.  She describes her symptoms of the lower extremity on the right side as numbness and pulsating.   Since last visit on 11/3/23 patient developed some darkening of her stool with epigastric pain.  She was seen in urgent care and diagnosed with possible upper GI bleed.  Patient has not followed up as recommended with established family practice or GI provider.  She did discontinue meloxicam.      Has previous treatment plan been beneficial for condition: Yes for eliminating right hemipelvic pain but she continues to have persistent right lateral thigh and right anterior shin/foot numbness/discomfort  Location of Pain: right lateral thigh and lower leg   Rating of Pain at worst: 9/10 - morning  Rating of Pain Currently: 7/10 - intermittent  Worsened by: flexing the knee for long periods, pain is worse in the morning  Better with: lifting the leg/extending  Treatments tried: ice, heat, image guided right hip intra-articular corticosteroid injection (most recent date: 11/22/23) that provided several  day(s) of relief, and Voltaren Topical, ACE wrap on the calf, compression stockings   Quality: intermittent aching, dull, fullness of the calf  Associated symptoms: weakness of the leg with walking and feeling of instability      No past medical history on file.  Social History     Socioeconomic History    Marital status:    Tobacco Use    Smoking status: Never    Smokeless tobacco: Never   Vaping Use    Vaping Use: Never used         Patient's past medical, surgical, social, and family histories were personally reviewed today and no changes are noted.  REVIEW OF SYSTEMS:  Review of Systems    OBJECTIVE:  There were no vitals taken for this visit.   General: healthy, alert and in no distress  HEENT: no scleral icterus or conjunctival erythema  Skin: no suspicious lesions or rash. No jaundice.  CV: no pedal edema  Resp: normal respiratory effort without conversational dyspnea   Psych: normal mood and affect  Gait: normal steady gait with appropriate coordination and balance  Neuro: Normal light sensory exam of lower extremity      MSK:  Exam shows a 63-year-old female who presents alone.  Online  is used for communication.  25 minutes of dialogue today just obtaining response to intra-articular right hip cortisone injection and current pain complaints.  Examination of the right lower extremity shows no bruising, no swelling and no ecchymosis.  She is completely nontender to firm palpation throughout the entire gastrocsoleus complex including the ankle and foot on the right side.  No pain along the medial or lateral joint line of the knee.  She has full knee extension and flexion within normal limits at the knee.  She prefers to hold her heel off the ground keeping her right knee fully extended for symptomatic relief.  Patient states that she has decreased sensation in the L5 dermatome on the right foot compared to the left at the inner webspace between the first and second digits.  Patient has full  active and passive range of motion of the right hip without significant discomfort.  Slight discomfort to palpation from the right hip greater trochanter along the IT band of the right thigh.  Reproducible positive straight leg raise on the right causing radicular pain pattern along the L5 distribution which is negative on the left.  BOONE testing negative.  FADIR test negative.  Lumbar motion shows she forward flexes bringing her fingertips to proximal and mid should level causing right-sided low back pain as well as right leg radicular findings.  Lumbar extension does not generate pain.  Lateral bending to the right is mildly positive for right-sided low back pain and negative to the left.  Rotation does not cause discomfort.        Patient's conditions were thoroughly discussed during today's visit with total time spent face-to-face with the patient and documentation being 70 minutes.    Giovanni North PA-C  [facility]    This note was completed in part using a voice recognition software, any grammatical or context distortion are unintentional and inherent to the software.

## 2023-12-13 ENCOUNTER — OFFICE VISIT (OUTPATIENT)
Dept: PEDIATRICS | Facility: CLINIC | Age: 63
End: 2023-12-13
Payer: COMMERCIAL

## 2023-12-13 ENCOUNTER — THERAPY VISIT (OUTPATIENT)
Dept: PHYSICAL THERAPY | Facility: CLINIC | Age: 63
End: 2023-12-13
Attending: PHYSICIAN ASSISTANT
Payer: COMMERCIAL

## 2023-12-13 VITALS
DIASTOLIC BLOOD PRESSURE: 70 MMHG | SYSTOLIC BLOOD PRESSURE: 114 MMHG | BODY MASS INDEX: 31.31 KG/M2 | OXYGEN SATURATION: 98 % | HEART RATE: 74 BPM | HEIGHT: 67 IN | TEMPERATURE: 98.2 F | RESPIRATION RATE: 20 BRPM | WEIGHT: 199.5 LBS

## 2023-12-13 DIAGNOSIS — K92.1 MELENA: Primary | ICD-10-CM

## 2023-12-13 DIAGNOSIS — Z11.59 NEED FOR HEPATITIS C SCREENING TEST: ICD-10-CM

## 2023-12-13 DIAGNOSIS — R10.13 EPIGASTRIC PAIN: ICD-10-CM

## 2023-12-13 DIAGNOSIS — Z13.220 LIPID SCREENING: ICD-10-CM

## 2023-12-13 DIAGNOSIS — G89.29 CHRONIC BILATERAL LOW BACK PAIN WITH RIGHT-SIDED SCIATICA: Primary | ICD-10-CM

## 2023-12-13 DIAGNOSIS — G89.29 CHRONIC BILATERAL LOW BACK PAIN WITH RIGHT-SIDED SCIATICA: ICD-10-CM

## 2023-12-13 DIAGNOSIS — J02.9 SORE THROAT: ICD-10-CM

## 2023-12-13 DIAGNOSIS — M54.41 CHRONIC BILATERAL LOW BACK PAIN WITH RIGHT-SIDED SCIATICA: ICD-10-CM

## 2023-12-13 DIAGNOSIS — M54.16 CHRONIC RADICULAR LOW BACK PAIN: ICD-10-CM

## 2023-12-13 DIAGNOSIS — Z12.31 VISIT FOR SCREENING MAMMOGRAM: ICD-10-CM

## 2023-12-13 DIAGNOSIS — Z11.4 SCREENING FOR HIV (HUMAN IMMUNODEFICIENCY VIRUS): ICD-10-CM

## 2023-12-13 DIAGNOSIS — Z12.11 SCREEN FOR COLON CANCER: ICD-10-CM

## 2023-12-13 DIAGNOSIS — G89.29 CHRONIC RADICULAR LOW BACK PAIN: ICD-10-CM

## 2023-12-13 DIAGNOSIS — M54.41 CHRONIC BILATERAL LOW BACK PAIN WITH RIGHT-SIDED SCIATICA: Primary | ICD-10-CM

## 2023-12-13 DIAGNOSIS — D50.9 MICROCYTIC ANEMIA: ICD-10-CM

## 2023-12-13 LAB
ERYTHROCYTE [DISTWIDTH] IN BLOOD BY AUTOMATED COUNT: 15.4 % (ref 10–15)
HCT VFR BLD AUTO: 27.4 % (ref 35–47)
HGB BLD-MCNC: 8 G/DL (ref 11.7–15.7)
MCH RBC QN AUTO: 26.7 PG (ref 26.5–33)
MCHC RBC AUTO-ENTMCNC: 29.2 G/DL (ref 31.5–36.5)
MCV RBC AUTO: 91 FL (ref 78–100)
PLATELET # BLD AUTO: 337 10E3/UL (ref 150–450)
RBC # BLD AUTO: 3 10E6/UL (ref 3.8–5.2)
WBC # BLD AUTO: 4.4 10E3/UL (ref 4–11)

## 2023-12-13 PROCEDURE — 90480 ADMN SARSCOV2 VAC 1/ONLY CMP: CPT | Performed by: PHYSICIAN ASSISTANT

## 2023-12-13 PROCEDURE — 90715 TDAP VACCINE 7 YRS/> IM: CPT | Performed by: PHYSICIAN ASSISTANT

## 2023-12-13 PROCEDURE — 85027 COMPLETE CBC AUTOMATED: CPT | Performed by: PHYSICIAN ASSISTANT

## 2023-12-13 PROCEDURE — 97161 PT EVAL LOW COMPLEX 20 MIN: CPT | Mod: GP | Performed by: PHYSICAL THERAPIST

## 2023-12-13 PROCEDURE — 87389 HIV-1 AG W/HIV-1&-2 AB AG IA: CPT | Performed by: PHYSICIAN ASSISTANT

## 2023-12-13 PROCEDURE — 90471 IMMUNIZATION ADMIN: CPT | Performed by: PHYSICIAN ASSISTANT

## 2023-12-13 PROCEDURE — 91320 SARSCV2 VAC 30MCG TRS-SUC IM: CPT | Performed by: PHYSICIAN ASSISTANT

## 2023-12-13 PROCEDURE — 97110 THERAPEUTIC EXERCISES: CPT | Mod: GP | Performed by: PHYSICAL THERAPIST

## 2023-12-13 PROCEDURE — 99214 OFFICE O/P EST MOD 30 MIN: CPT | Mod: 25 | Performed by: PHYSICIAN ASSISTANT

## 2023-12-13 PROCEDURE — 36415 COLL VENOUS BLD VENIPUNCTURE: CPT | Performed by: PHYSICIAN ASSISTANT

## 2023-12-13 PROCEDURE — 80061 LIPID PANEL: CPT | Performed by: PHYSICIAN ASSISTANT

## 2023-12-13 RX ORDER — PANTOPRAZOLE SODIUM 40 MG/1
TABLET, DELAYED RELEASE ORAL
Qty: 42 TABLET | Refills: 0 | Status: SHIPPED | OUTPATIENT
Start: 2023-12-13 | End: 2023-12-13

## 2023-12-13 RX ORDER — PANTOPRAZOLE SODIUM 40 MG/1
40 TABLET, DELAYED RELEASE ORAL DAILY
Qty: 60 TABLET | Refills: 0 | Status: SHIPPED | OUTPATIENT
Start: 2023-12-13 | End: 2024-02-16

## 2023-12-13 ASSESSMENT — PAIN SCALES - GENERAL: PAINLEVEL: MODERATE PAIN (5)

## 2023-12-13 NOTE — COMMUNITY RESOURCES LIST (ENGLISH)
12/13/2023   [facility] [email or web address]  N/A  For questions about this resource list or additional care needs, please contact your primary care clinic or care manager.  Phone: [phone]   Email: N/A   Address: [address] [place] [address]   Hours: N/A        Financial Stability       Rent and mortgage payment assistance  1  [facility] Community Development Agency (CDA) Distance: 1.05 miles      In-Person   [address] [place] [address]  Language: English  Hours: Mon - Fri 9:00 AM - 4:00 PM  Fees: Free   Phone: (981) 965-3998 Email: info@[facility].mn. Website: http://www.[facility] NetworkGameOn.org/     2  [employer] - Emergency Assistance [place] - Rent and mortgage payment assistance Distance: 6.81 miles      In-Person, Phone/Virtual   [address] [place] [address]  Language: English, Barbadian  Hours: Mon - Fri 9:00 AM - 4:00 PM  Fees: Free   Phone: (272) 343-6100 Email: info@[employer].org Website: http://www.[employer].org          Transportation       Free or low-cost transportation  3  [employer] [facility] [employer] Bus Distance: 5.35 miles      In-Person   [address] [place] [address]  Language: English  Hours: Tue 9:00 AM - 2:00 PM  Fees: Self Pay   Phone: (634) 205-8748 Email: info@[employer].com Website: http://www.[employer].org/     4  [employer] Distance: 8 miles      In-Person   [address] [address]  Language: English  Hours: Mon - Fri 7:00 AM - 5:00 PM  Fees: Free   Phone: (566) 638-1753 Email: [employer]@[email or web address].[employer] Website: https://[employer].com/     Transportation to medical appointments  5  [employer] Distance: 1.39 miles      In-Person   [address] [place] [address]  Language: English, Barbadian  Hours: Mon - Sun Appt. Only  Fees: Self Pay   Phone: (583) 127-9802 Email: [email or web address].Air[employer] Website: http://www.assistedtransportmn.Airware/     6  [employer]  - [place] [employer] [facility] - Non-Emergency Medical Transportation Distance: 2.55 miles      In-Person   [address] [place] [address]  Language: English, Chadian, Andorran  Hours: Mon - Fri 7:00 AM - 6:00 PM  Fees: Insurance   Phone: (284) 719-5294 Ext.8167 Email: kerri@[employer] Website: http://www.[employer].net/[place]/          Important Numbers & Websites       Emergency Services   911  [facility] Services   311  Poison Control   (610) 600-1830  Suicide Prevention Lifeline   (249) 598-4353 (TALK)  Child Abuse Hotline   (269) 676-3999 (4-A-Child)  Sexual Assault Hotline   (290) 182-5298 (HOPE)  National Runaway Safeline   (820) 939-7730 (RUNAWAY)  All-Options Talkline   (125) 967-7330  Substance Abuse Referral   (151) 967-3711 (HELP)

## 2023-12-13 NOTE — PROGRESS NOTES
PHYSICAL THERAPY EVALUATION  Type of Visit: Evaluation    See electronic medical record for Abuse and Falls Screening details.    Subjective       Presenting condition or subjective complaint:      The patient presents to therapy with a chief complaint of right lumbar radiculopathy.  Had an injection in the R hip on 11/22/23 with significant improvement in this pain. She continues to experience pain in the R lumbar region and radicular symptoms. Did not have a change in symptoms with Predisone, now on Gabapentin. Avoiding NSAIDS right now due to an epigastric bleed potentially from prolonged NSAID use.  Feels numbness and cramping in the R LE (pointing to the r anterior shin). Doesn't hurt when she is walking, but it starts to hurt when she is sitting or when she goes to stand up after sitting. Putting socks on is painful. Feels better with using the Diclofenac from the doctor.     Lumbar xray:   No fracture. Normal vertebral heights and alignment. Mild degenerative convex right scoliotic curvature with apex at L3. Mild to moderate degenerative disc disease L3-S1 with loss of disc height and marginal osteophytic spurring about the   disc spaces. Moderate multilevel bilateral facet arthrosis throughout the lumbar spine, most advanced at L4-L5 and L5-S1. Nonobstructive bowel gas pattern.    Hip xray:   Right hip joint space is maintained. There is no fracture or dislocation. Right greater left SI joint arthrosis.    Date of onset: 11/28/23 (MD order date)    Relevant medical history:     Dates & types of surgery:      Prior diagnostic imaging/testing results:       Prior therapy history for the same diagnosis, illness or injury:        Living Environment  Social support:     Type of home:     Stairs to enter the home:         Ramp:     Stairs inside the home:         Help at home:    Equipment owned:       Employment:      Hobbies/Interests:      Patient goals for therapy:      Pain assessment:      Objective      LUMBAR:    Posture: WNL     Gait: WNL     Neurological:    Motor Deficit:  Myotomes L R   L1-2 (hip flexion) 5 5   L3 (knee extension) 5 5   L4 (ankle DF) 5 5   L5 (g. toe ext) 5 5   S1 (ankle PF or knee flex) 55 5     Sensory Deficit, Reflexes: normal     Dural Signs:   L R   Slump neg pos   SLR neg pos       AROM: (min, mod, max, + indicates positive pain)  Movement ROM    Flexion Finger tips to mid shin-reproduces LE pain    Extension Mod limited-feels good relieves symptoms    Side Gliding/Bend L    Side Gliding/Bend R          Palpation: non tender to palpation throughout lumbar spine, min tenderness to R greater troch and glute med     Assessment & Plan   CLINICAL IMPRESSIONS  Medical Diagnosis: Chronic radicular low back pain    Treatment Diagnosis: Right Lumbar Radiculopathy   Impression/Assessment: Patient is a 63 year old female with low back and radicular complaints.  The following significant findings have been identified: Pain, Decreased ROM/flexibility, Decreased joint mobility, Decreased strength, Impaired balance, Inflammation, Impaired gait, Impaired muscle performance, and Decreased activity tolerance. These impairments interfere with their ability to perform self care tasks, work tasks, recreational activities, household chores, driving , household mobility, and community mobility as compared to previous level of function.     Clinical Decision Making (Complexity):  Clinical Presentation: Stable/Uncomplicated  Clinical Presentation Rationale: based on medical and personal factors listed in PT evaluation  Clinical Decision Making (Complexity): Low complexity    PLAN OF CARE  Treatment Interventions:  Modalities: Cryotherapy, E-stim, Hot Pack, Mechanical Traction  Interventions: Gait Training, Manual Therapy, Neuromuscular Re-education, Therapeutic Activity, Therapeutic Exercise, Self-Care/Home Management    Long Term Goals     PT Goal 1  Goal Identifier: Prolonged Positioning.  Goal  Description: The patient will be able to sustain a position, either sitting or standing x30 minutes with appropriate posture and pain <3/10.  Rationale: to maximize safety and independence with performance of ADLs and functional tasks;to maximize safety and independence with self cares  Target Date: 03/06/24  PT Goal 2  Goal Identifier: Ambulation  Goal Description: The patient will be able to ambulate x30 minutes with pain <3/10.  Rationale: to maximize safety and independence with performance of ADLs and functional tasks;to maximize safety and independence within the home;to maximize safety and independence with self cares  Target Date: 03/06/24      Frequency of Treatment: 1x/week  Duration of Treatment: 12 weeks    Recommended Referrals to Other Professionals:   Education Assessment:   Learner/Method: Patient  Education Comments: Eager to participate in therapy    Risks and benefits of evaluation/treatment have been explained.   Patient/Family/caregiver agrees with Plan of Care.     Evaluation Time:             Signing Clinician: NELLIE EVANS, PT      [facility]                                                                                   OUTPATIENT PHYSICAL THERAPY      PLAN OF TREATMENT FOR OUTPATIENT REHABILITATION   Patient's Last Name, First Name, Tiffanie Garza    YOB: 1960   Provider's Name   [facility]   Medical Record No.  [id number]     Onset Date: 11/28/23 (MD order date)  Start of Care Date: 12/13/23     Medical Diagnosis:  Chronic radicular low back pain      PT Treatment Diagnosis:  Right Lumbar Radiculopathy Plan of Treatment  Frequency/Duration: 1x/week/ 12 weeks    Certification date from 12/13/23 to 03/06/24         See note for plan of treatment details and functional goals     NELLIE EVANS, PT                         I CERTIFY THE NEED FOR THESE SERVICES FURNISHED UNDER        THIS PLAN OF  TREATMENT AND WHILE UNDER MY CARE     (Physician attestation of this document indicates review and certification of the therapy plan).              Referring Provider:  Giovanni North    Initial Assessment  See Epic Evaluation- Start of Care Date: 12/13/23

## 2023-12-13 NOTE — PROGRESS NOTES
"  Assessment & Plan     Melena  Has stopped her NSAID- using PPI- has improvement in sxs. No longer Melena   - CBC with platelets  - Adult GI  Referral - Procedure Only  - CBC with platelets  - pantoprazole (PROTONIX) 40 MG EC tablet  Dispense: 60 tablet; Refill: 0    Epigastric pain  Continue PPI, avoid NSAIDs. Referral to GI for EGD  - pantoprazole (PROTONIX) 40 MG EC tablet  Dispense: 60 tablet; Refill: 0    Visit for screening mammogram    - MA SCREENING DIGITAL BILAT - Future  (s+30)    Microcytic anemia  Referral to GI for EGD, avoid nsaids    Chronic bilateral low back pain with right-sided sciatica  Letter written for work. Continue PT    Sore throat  Likely viral. Encouraged fluids, rest, salt water rinses.  tylenol for pain. Patient to follow up in clinic is symptoms persist or worsen.     Screen for colon cancer    - Colonoscopy Screening  Referral    Screening for HIV (human immunodeficiency virus)    - HIV Antigen Antibody Combo  - HIV Antigen Antibody Combo    Need for hepatitis C screening test      Lipid screening    - Lipid panel reflex to direct LDL Non-fasting  - Lipid panel reflex to direct LDL Non-fasting             MED REC REQUIRED  Post Medication Reconciliation Status: patient was not discharged from an inpatient facility or TCU  BMI:   Estimated body mass index is 31.68 kg/m  as calculated from the following:    Height as of this encounter: 1.69 m (5' 6.54\").    Weight as of this encounter: 90.5 kg (199 lb 8 oz).   Weight management plan: Discussed healthy diet and exercise guidelines        KAYLA Small [facility] [place] is a 63 year old, presenting for the following health issues:  UC Follow-Up      12/13/2023     1:39 PM   Additional Questions   Roomed by Ct Michael   Accompanied by N/A         12/13/2023     1:39 PM   Patient Reported Additional Medications   Patient reports taking the following new medications No       HPI " "      ED/UC Followup:    Facility:  [facility]  Date of visit: 11/25/2023  Reason for visit: Upper GI bleeding, Epigastric pain , and right knee   Current Status: Better      1. Here for follow up urgent care visit for epigastric pan.  Noted low hemoglobin. Started on PPI told to stop nsaids.  2. Goes to PT for back pain. Had DJD lower back, radiates down to right leg. Needs restrictions for work.  3. Sore throat, noises in chest. Noted for two days. No fevers. Using Tylenol. Helps some. No sob. No hx of asthma, no tob use. No others sick.       Review of Systems         Objective    /70 (BP Location: Right arm, Patient Position: Sitting, Cuff Size: Adult Large)   Pulse 74   Temp 98.2  F (36.8  C) (Tympanic)   Resp 20   Ht 1.69 m (5' 6.54\")   Wt 90.5 kg (199 lb 8 oz)   SpO2 98%   BMI 31.68 kg/m    Body mass index is 31.68 kg/m .  Physical Exam   GENERAL: healthy, alert and no distress  EYES: Eyes grossly normal to inspection, PERRL and conjunctivae and sclerae normal  HENT: ear canals and TM's normal, nose and mouth without ulcers or lesions  NECK: no adenopathy, no asymmetry, masses, or scars and thyroid normal to palpation  RESP: lungs clear to auscultation - no rales, rhonchi or wheezes  CV: regular rate and rhythm, normal S1 S2, no S3 or S4, no murmur, click or rub, no peripheral edema and peripheral pulses strong  ABDOMEN: soft, nontender, no hepatosplenomegaly, no masses and bowel sounds normal  SKIN: no suspicious lesions or rashes  PSYCH: mentation appears normal, affect normal/bright                      "

## 2023-12-13 NOTE — PATIENT INSTRUCTIONS
Making appointments with me can happen in the following ways:    Call [phone]. Depending on your needs, this can be in person or virtual.  You can also schedule appointments on your own through [facility].   If you are part of [employer], there is also an option for E-Visits when appropriate. Please follow the recommended guidelines for this.  4.   You are also welcome to schedule with my partner, Silvana Coleman.  She's an NP that works closely with me in helping my access in seeing patients when they can't get in to see me in a timely manner.     Communication with me can happen in the following ways:  Call [phone] with your concerns.  Use [facility]     The [email or web address].[email or web address] update is intended for a one-way communication to update your medical history and not intended to be a back-and-forth or for medical advice for new issues.     A reminder that an evisit is intended for any medical advice , prescription, labs or referrals that are needed.

## 2023-12-13 NOTE — LETTER
December 13, 2023      Tiffanie Pozo  [address] [place]   [place] [address]        To Whom It May Concern:    Tiffanie Pozo  was seen on 12/13/2023 . She is able to work with the following restrictions:  1. No lifting more then 15#  2. No standing greater then 30 min per hour. No sitting greater then 30 min per hour.   3. No lifting from below the waist  4. No push or pull more then 25#    Restrictions x 4 weeks.     Sincerely,        Lucila Zayas PA-C

## 2023-12-13 NOTE — COMMUNITY RESOURCES LIST (ENGLISH)
12/13/2023   [facility] KSKT  N/A  For questions about this resource list or additional care needs, please contact your primary care clinic or care manager.  Phone: [phone]   Email: N/A   Address: [facility] [place] [address]   Hours: N/A        Financial Stability       Rent and mortgage payment assistance  1  [facility] Community Development Agency (CDA) Distance: 1.05 miles      In-Person   [address] [place] [address]  Language: English  Hours: Mon - Fri 9:00 AM - 4:00 PM  Fees: Free   Phone: [phone]) [phone] Email: info@[facility].mn. Website: http://www.[facility] HospitalCadigo.org/     2  [employer] - Emergency Assistance [place] - Rent and mortgage payment assistance Distance: 6.81 miles      In-Person, Phone/Virtual   [address] [place] [address]  Language: English, Indian  Hours: Mon - Fri 9:00 AM - 4:00 PM  Fees: Free   Phone: (524) 316-4813 Email: info@Revert.IO.org Website: http://www.Revert.IO.org          Transportation       Free or low-cost transportation  3  [employer] [facility] Circulator Bus Distance: 5.35 miles      In-Person   [address] [place] [address]  Language: English  Hours: Tue 9:00 AM - 2:00 PM  Fees: Self Pay   Phone: (488) 525-3742 Email: info@MiMedia.Graphenea Website: http://www.Canvita.org/     4  Feedback-Machine. Distance: 8 miles      In-Person   [address] [address]  Language: English  Hours: Mon - Fri 7:00 AM - 5:00 PM  Fees: Free   Phone: (852) 828-2565 Email: HemoShear@Swoon Editions.Clickatell Website: https://Sociocast.com/     Transportation to medical appointments  5  Assisted Transport Distance: 1.39 miles      In-Person   [address] [place] [address]  Language: English, Indian  Hours: Mon - Sun Appt. Only  Fees: Self Pay   Phone: (876) [phone] Email: gudelia@Insight Guruportmn.Clickatell Website: http://www.assistedtransportmn.Clickatell/     6  MT[employer]  - [place] Transportation [facility] - Non-Emergency Medical Transportation Distance: 2.55 miles      In-Person   [address] [place] [address]  Language: English, Bruneian, Uzbek  Hours: Mon - Fri 7:00 AM - 6:00 PM  Fees: Insurance   Phone: [phone] Ext.[phone] Email: kerri@LemonCrate Website: http://www.RoleStar.net/[place]/          Important Numbers & Websites       Emergency Services   911  [facility] Services   311  Poison Control   (216) 523-2293  Suicide Prevention Lifeline   (465) 729-3562 (TALK)  Child Abuse Hotline   (225) 810-9913 (4-A-Child)  Sexual Assault Hotline   (100) 179-4420 (HOPE)  National Runaway Safeline   (652) 499-1266 (RUNAWAY)  All-Options Talkline   (695) 845-4173  Substance Abuse Referral   [phone]) 176-5900 (HELP)

## 2023-12-13 NOTE — COMMUNITY RESOURCES LIST (PATIENT PREFERRED LANGUAGE)
12/13/2023   [facility] myGreek  N/A  Si tiene preguntas sobre esta lista de recursos o necesidades de atención adicionales, comuníquese con sotelo clínica de atención primaria o administrador de atención.  Phone: [phone]   Email: N/A   Address: [address] [place] [address]   Hours: N/A        Estabilidad financiera       Asistencia para el pago de alquiler e hipoteca  1  [employer]cia de [employer]o [facility]unit[employer] del [facility] (CDA) Distancia: 1.05 millas      En persona   [address] [place] [address]  Idioma: Marielos Yañez: dinorah - rashad 9:00 - 16:00  Honorarios: Tamela   Phone: [phone] Email: info@[facility].mn. Website: http://www.OwnLocal."Madison Reed[employer] Inc."/     2  Yuen[employer]i [employer]Peyton - Subvenciones de asistencia de emergencia: asistencia para el pago de alquiler e hipoteca Distancia: 6.81 millas      En persona, Teléfono/Virtual   [address] [place] [address]  Idioma: Marielos Costa: dinorah - vierlucio 9:00 - 16:00  Honorarios: Tamela   Phone: [phone] Email: info@[employer].org Website: http://www.[facility].org          Transporte       Transporte gratuito o de bajo costo.  3  DARDOS - The LOOP - Autobús circulador de Rajesh Distancia: 5.35 millas      En persona   [address] [place] [address]  Idioma: Marielos Yañez: deep 9:00 - 14:00  Honorarios: Auto pago   Phone: [phone] Email: info@Garnet [employer]rapeutics."Madison Reed[employer] Inc." Website: http://www.Mommy Nearest.org/     4  [employer]ell[employer]utland [employer]ICA [employer]eyton Distancia: 8 millas      En persona   [address] [place] [address]  Idioma: Marielos Yañez: dinorah solorzano 7:00 - 17:00  Honorarios: Tamela   Phone: [phone] Email: joy@Emair.Groom Energy Solutions Website: https://Noveda Technologies/     Transporte a citas médicas.  5  Transporte Asistido Distancia: 1.39 millas      En persona   [address] [place] [address]  Idioma: Marielos Costa: dinorah welch Jadyn Solamente   Honorarios: Auto pago   Phone: [phone] Email: gudelia@VM Enterprises.NextG Networks Website: http://www.VM Enterprises.NextG Networks/     6  [facility] - Programas de transporte de [place] - Transporte médico que no sea de emergencia Distancia: 2.55 millas      En persona   [address] [place] [address]  Idioma: Español, Inglés, somalí  Horas: dinorah solorzano 7:00 - 18:00  Honorarios: Froylan   Phone: [phone] ext.[phone] Email: kerri@Wish Days Website: http://www.Scouponnet/[place]/          Números y sitios web importantes       Servicios de emergencia   911  Servicios de la ciudad   311  Control de envenenamiento   ([phone] [phone]  Línea vital de prevención del suicidio   [phone] [phone] (TALK)  Línea directa contra el abuso de niños   [phone] (4-A-Child)  Línea directa contra el abuso sexual   [phone] (HOPE)  Línea directa nacional de emergencia para fugitivos   [phone] (RUNAWAY)  Línea de ayuda para la andrea embarazada   [phone]  Derivación para el tratamiento por abuso de sustancias   [phone] (HELP)

## 2023-12-13 NOTE — COMMUNITY RESOURCES LIST (PATIENT PREFERRED LANGUAGE)
12/13/2023   [facility] [employer]  N/A  Si tiene preguntas sobre esta lista de recursos o necesidades de atención adicionales, comuníquese con sotelo clínica de atención primaria o administrador de atención.  Phone: [phone]   Email: N/A   Address: [address] [place] [address]   Hours: N/A        Estabilidad financiera       Asistencia para el pago de alquiler e hipoteca  1  Agencia de Desarrollo Comunitario del [facility] (CDA) Distancia: 1.05 millas      En persona   [address] [place] [address]  Idioma: Marielos Yañez: dinorah - rashad 9:00 - 16:00  Honorarios: Tamela   Phone: [phone] Email: info@[facility].mn. Website: http://www.L2C.Panera Bread/     2  ForeScout Technologies IncPeyton - Subvenciones de asistencia de emergencia: asistencia para el pago de alquiler e hipoteca Distancia: 6.81 millas      En persona, Teléfono/Virtual   [address] [place] [address]  Idioma: Marielos Costa: dinorah - vierlucio 9:00 - 16:00  Honorarios: Tamela   Phone: [phone] Email: info@[employer].org Website: http://www.Reform[employer].org          Transporte       Transporte gratuito o de bajo costo.  3  DARDOS - The LOOP - Autobús circulador de Rajesh Distancia: 5.35 millas      En persona   [address] [place] [address]  Idioma: Marielos Yañez: deep 9:00 - 14:00  Honorarios: Auto pago   Phone: [phone] Email: info@MakeSpace.Panera Bread Website: http://www.Orthos.org/     4  HexaTechiciulikeMONICA IncPeyton Distancia: 8 millas      En persona   [address] [facility] [address] [place] [address]  Idioma: Marielos Yañez: dinorah solorzano 7:00 - 17:00  Honorarios: Tamela   Phone: [phone] Email: [email or web address]@[email or web address].Wedding.com.my Website: https://Tansna Therapeutics/     Transporte a citas médicas.  5  Transporte Asistido Distancia: 1.39 millas      En persona   [address] [place] [address]  Idioma: Marielos Costa: dinorah welch Jadyn Solamente   Honorarios: Auto pago   Phone: [phone] Email: gudelia@Reality Sports Online.MedSynergies Website: http://www.Reality Sports Online.MedSynergies/     6  [facility] - Programas de transporte de [place] - Transporte médico que no sea de emergencia Distancia: 2.55 millas      En persona   [address] [place] [address]  Idioma: Español, Inglés, somalí  Horas: dinorah solorzano 7:00 - 18:00  Honorarios: Froylan   Phone: [phone] Email: kerri@[email or web address] Website: http://www.OrthoHelix Surgical Designsnet/[place]/          Números y sitios web importantes       Servicios de emergencia   911  Servicios de la ciudad   311  Control de envenenamiento   [phone]  Línea vital de prevención del suicidio   [phone] (TALK)  Línea directa contra el abuso de niños   [phone] (4-A-Child)  Línea directa contra el abuso sexual   [phone] (HOPE)  Línea directa nacional de emergencia para fugitivos   [phone] (RUNAWAY)  Línea de ayuda para la andrea embarazada   [phone]  Derivación para el tratamiento por abuso de sustancias   [phone] (HELP)

## 2023-12-14 LAB
CHOLEST SERPL-MCNC: 191 MG/DL
FASTING STATUS PATIENT QL REPORTED: NO
HDLC SERPL-MCNC: 52 MG/DL
HIV 1+2 AB+HIV1 P24 AG SERPL QL IA: NONREACTIVE
LDLC SERPL CALC-MCNC: 106 MG/DL
NONHDLC SERPL-MCNC: 139 MG/DL
TRIGL SERPL-MCNC: 163 MG/DL

## 2023-12-15 ENCOUNTER — TELEPHONE (OUTPATIENT)
Dept: PEDIATRICS | Facility: CLINIC | Age: 63
End: 2023-12-15
Payer: COMMERCIAL

## 2023-12-15 NOTE — TELEPHONE ENCOUNTER
Prior Authorization Retail Medication Request    Medication/Dose: pantoprazole (PROTONIX) 40 MG EC tablet  Diagnosis and ICD code (if different than what is on RX):    Melena [K92.1]  - Primary      Epigastric pain [R10.13]        New/renewal/insurance change PA/secondary ins. PA:  Previously Tried and Failed:    Rationale:      Insurance   Primary:   Insurance ID:     Secondary (if applicable):  Insurance ID:      Pharmacy Information (if different than what is on RX)  Name:  [facility]  Phone:    Fax:

## 2023-12-18 ENCOUNTER — APPOINTMENT (OUTPATIENT)
Dept: INTERPRETER SERVICES | Facility: CLINIC | Age: 63
End: 2023-12-18
Payer: COMMERCIAL

## 2023-12-20 ENCOUNTER — TELEPHONE (OUTPATIENT)
Dept: GASTROENTEROLOGY | Facility: CLINIC | Age: 63
End: 2023-12-20

## 2023-12-20 NOTE — TELEPHONE ENCOUNTER
"Endoscopy Scheduling Screen    Have you had a positive Covid test in the last 14 days?  No    Are you active on MyChart?   Yes    What insurance is in the chart?  Other:  Blue Plus    Ordering/Referring Provider: Lucila Zayas PA-C   (If ordering provider performs procedure, schedule with ordering provider unless otherwise instructed. )    BMI: Estimated body mass index is 31.68 kg/m  as calculated from the following:    Height as of 12/13/23: 1.69 m (5' 6.54\").    Weight as of 12/13/23: 90.5 kg (199 lb 8 oz).     Sedation Ordered  MAC/deep sedation.   BMI<= 45 45 < BMI <= 48 48 < BMI < = 50  BMI > 50   No Restrictions No [facility]  No [facility]  [facility] with exceptions Hospital Only OR Only       Are you taking any prescription medications for pain 3 or more times per week?   NO - No RN review required.    Do you have a history of malignant hyperthermia or adverse reaction to anesthesia?  No    (Females) Are you currently pregnant?   No     Have you been diagnosed or told you have pulmonary hypertension?   No    Do you have an LVAD?  No    Have you been told you have moderate to severe sleep apnea?  No    Have you been told you have COPD, asthma, or any other lung disease?  No    Do you have any heart conditions?  No     Have you ever had an organ transplant?   No    Have you ever had or are you awaiting a heart or lung transplant?   No    Have you had a stroke or transient ischemic attack (TIA aka \"mini stroke\" in the last 6 months?   No    Have you been diagnosed with or been told you have cirrhosis of the liver?   No    Are you currently on dialysis?   No    Do you need assistance transferring?   No    BMI: Estimated body mass index is 31.68 kg/m  as calculated from the following:    Height as of 12/13/23: 1.69 m (5' 6.54\").    Weight as of 12/13/23: 90.5 kg (199 lb 8 oz).     Is patients BMI > 40 and scheduling location [facility]?  No    Do you take an injectable medication for weight loss or diabetes (excluding " insulin)?  No    Do you take the medication Naltrexone?  No    Do you take blood thinners?  No       Prep   Are you currently on dialysis or do you have chronic kidney disease?  No    Do you have a diagnosis of diabetes?  No    Do you have a diagnosis of cystic fibrosis (CF)?  No    On a regular basis do you go 3 -5 days between bowel movements?  No    BMI > 40?  No    Preferred Pharmacy:    [id number] - [place] - [address]  AT [facility] & [address] [place] [address]  Phone: [phone] Fax: [phone]      Final Scheduling Details   Colonoscopy prep sent?  Standard MiraLAX    Procedure scheduled  Colonoscopy / Upper endoscopy (EGD)    Surgeon:  CHRISTIN     Date of procedure:  03/20/2024     Pre-OP / PAC:   No - Not required for this site.    Location  [facility] - ASC - Patient preference.    Sedation   MAC/Deep Sedation - Per order.      Patient Reminders:   You will receive a call from a Nurse to review instructions and health history.  This assessment must be completed prior to your procedure.  Failure to complete the Nurse assessment may result in the procedure being cancelled.      On the day of your procedure, please designate an adult(s) who can drive you home stay with you for the next 24 hours. The medicines used in the exam will make you sleepy. You will not be able to drive.      You cannot take public transportation, ride share services, or non-medical taxi service without a responsible caregiver.  Medical transport services are allowed with the requirement that a responsible caregiver will receive you at your destination.  We require that drivers and caregivers are confirmed prior to your procedure.

## 2023-12-20 NOTE — TELEPHONE ENCOUNTER
Central Prior Authorization Team   Phone: [phone]    PA Initiation    Medication: PANTOPRAZOLE SODIUM 40 MG PO [facility]  Insurance Company: Blue Plus [facility] - Phone [phone] Fax [phone]  Pharmacy Filling the Rx: [facility] [place] - [address] [facility]   Filling Pharmacy Phone: [phone]  Filling Pharmacy Fax:    Start Date: 12/20/2023    Started PA on CMM and a response of No PA required, Prescription is within prescribing limits.  Pharmacy has received a paid claim.

## 2023-12-26 DIAGNOSIS — G89.29 CHRONIC RADICULAR LOW BACK PAIN: ICD-10-CM

## 2023-12-26 DIAGNOSIS — M54.16 CHRONIC RADICULAR LOW BACK PAIN: ICD-10-CM

## 2023-12-29 ENCOUNTER — APPOINTMENT (OUTPATIENT)
Dept: INTERPRETER SERVICES | Facility: CLINIC | Age: 63
End: 2023-12-29
Payer: COMMERCIAL

## 2023-12-29 RX ORDER — GABAPENTIN 300 MG/1
300 CAPSULE ORAL 2 TIMES DAILY
Qty: 60 CAPSULE | Refills: 0 | Status: SHIPPED | OUTPATIENT
Start: 2023-12-29 | End: 2024-02-20

## 2023-12-29 NOTE — TELEPHONE ENCOUNTER
Refill done.  Patient to continue with physical therapy program.  Patient to be seen back in the office for repeat assessment before future gabapentin refills.

## 2023-12-29 NOTE — TELEPHONE ENCOUNTER
Medication Request for: Gabapentin 300mg            Prescription last written on 11/28/23 by Giovanni North PA-C   Sig: take 1 capsule twice daily    Last Fill Quantity: 60 with # refills: 0     Last Office Visit by provider: 11/28/23    Patient has attended one physical therapy visit on 12/13/23 and  has another one scheduled on 1/2/24.     There isn't a consent to communicate on file.     Phone call to patient with the assist of a .     Patient is taking the Gabapentin 1-2 times daily depending on how drowsy it makes her. She feels she has 60% improvement of symptoms. She states she will either have no pain in her leg and back or just mild. She had to cancel a physical therapy appointment due to illness but has it rescheduled.     Will discuss with provider to see if refill is appropriate and get back with her. She verbalized understanding.     Please advise regarding refill request.     KAHLIL Zhao RN

## 2023-12-31 ENCOUNTER — HEALTH MAINTENANCE LETTER (OUTPATIENT)
Age: 63
End: 2023-12-31

## 2024-01-02 ENCOUNTER — THERAPY VISIT (OUTPATIENT)
Dept: PHYSICAL THERAPY | Facility: CLINIC | Age: 64
End: 2024-01-02
Payer: COMMERCIAL

## 2024-01-02 DIAGNOSIS — G89.29 CHRONIC BILATERAL LOW BACK PAIN WITH RIGHT-SIDED SCIATICA: Primary | ICD-10-CM

## 2024-01-02 DIAGNOSIS — M54.41 CHRONIC BILATERAL LOW BACK PAIN WITH RIGHT-SIDED SCIATICA: Primary | ICD-10-CM

## 2024-01-02 PROCEDURE — 97110 THERAPEUTIC EXERCISES: CPT | Mod: GP | Performed by: PHYSICAL THERAPIST

## 2024-01-02 NOTE — TELEPHONE ENCOUNTER
Phone call to patient with the assist of a . She was informed that refill was sent and of recommendations below. She would like the refill sent to [facility]. Instructed her to contact [facility] and ask that prescription be transferred. She states she will pick it up at [facility] instead.     Patient will be traveling and out of town in February until 2/15/24.     Appointment scheduled for 2/20/24 at 10:20 with 10:05 am arrival with Giovanni North PA-C in [place]. She verbalized understanding.     KAHLIL Zhao RN

## 2024-01-10 ENCOUNTER — TELEPHONE (OUTPATIENT)
Dept: GASTROENTEROLOGY | Facility: CLINIC | Age: 64
End: 2024-01-10
Payer: COMMERCIAL

## 2024-01-10 NOTE — TELEPHONE ENCOUNTER
Caller:   Reason for Reschedule/Cancellation (please be detailed, any staff messages or encounters to note?): CHRISTIN OUT      Prior to reschedule please review:  Ordering Provider:     CEDRICK PALACIOS     Sedation per order: MAC- EGD, CS- COLON  Does patient have any ASC Exclusions, please identify?:       Notes on Cancelled Procedure:  Procedure: Upper and Lower Endoscopy [EGD and Colonoscopy]   Date: 3/20  Location: [facility]; [address] [place] [address], [address] [place] [address]  Surgeon: CHRISTIN      Rescheduled: Yes  Procedure: Upper and Lower Endoscopy [EGD and Colonoscopy]   Date: 4/18  Location: [facility]; [address] [place] [address], [address] [place] [address]  Surgeon: CHRISTIN  Sedation Level Scheduled  MAC,  Reason for Sedation Level ORDER  Prep/Instructions updated and sent: Y       Send In - basket message to Panc - Juanpablo Pool if EUS  procedure is canceled or rescheduled: [ N/A, YES or NO]

## 2024-01-30 DIAGNOSIS — M54.16 CHRONIC RADICULAR LOW BACK PAIN: ICD-10-CM

## 2024-01-30 DIAGNOSIS — G89.29 CHRONIC RADICULAR LOW BACK PAIN: ICD-10-CM

## 2024-01-30 NOTE — TELEPHONE ENCOUNTER
Per Giovanni North PA-C: Pt must follow up before receiving more refills of Gabapentin, she is scheduled for a follow up appointment for 2/20/2024. Pending this request.    Medication Request for: Gabapentin 300MG            Prescription last written on 12/29/2023 by Giovanni North PA-C  Sig: Take 1 capsule (300 mg) by mouth 2 times daily   Last Fill Quantity: 60 with # refills: 0     Last Office Visit by provider: 11/18/2023  Future Office Visit:   2/20/2024    Phone number patient can be reached at: Cell number on file:    Telephone Information:   Mobile [phone]       Can we leave a detailed message on this number? JEANE Frias, Visit Facilitator

## 2024-02-14 NOTE — PROGRESS NOTES
ASSESSMENT & PLAN     Today we discussed the underlying etiology/pathology of patient's   1. Chronic radicular right low back pain- resolved 02/20/24    2. Primary osteoarthritis of right hip- improved with intra-articular cortisone injection 11/22/23    3. GI bleed due to NSAIDs      -We discussed the patient has no longer any low back pain or right lower extremity symptoms since being on gabapentin.  She has continue taking 300 mg tablet nightly.  - Patient is scheduled for upper and lower GI endoscopy in April 2024 due to melena with GI bleed secondary to NSAID use.  We reviewed her recent blood work which shows her hemoglobin has stabilized at 11.9  -Patient still should follow-up with GI as recommended for upper and lower scopes.  - Since patient is asymptomatic in regards to her low back and right leg radiculopathy she will discontinue gabapentin as of today.  If symptoms reappear patient will reengage for repeat assessment and discussion.    -Call direct clinic number [[phone]] at any time with questions or concerns in regards to your recent office visit with me.     Giovanni North PA-C  [facility]          SUBJECTIVE  Tiffanie Pozo is a/an 63 year old female who is seen for follow up of right low back pain with right lower extremity radiculopathy symptoms. The patient is seen by themselves.   Since last visit on 11/28/23 patient has complete improvement in pain and function.      Has previous treatment plan been beneficial for condition: yes   Location of Pain: right low back, lateral thigh and lower leg-fully resolved at this time  Rating of Pain at worst: 0/10  Rating of Pain Currently: 0/10  Worsened by: none, some soreness after working in the house for a while  Better with: treatments tried-gabapentin 300 mg nightly and physical therapy x 2 session  Treatments tried: rest/activity avoidance, ice, and other medications: Gabapentin, PT (2 visits), home exercises from  PT  Quality: soreness after working  Associated symptoms: no distal numbness or tingling; denies swelling or warmth      No past medical history on file.  Social History     Socioeconomic History    Marital status:    Tobacco Use    Smoking status: Never    Smokeless tobacco: Never   Vaping Use    Vaping Use: Never used   Social History Narrative        Lives alone    Work- variety of different work.      Social Determinants of Health     Financial Resource Strain: Low Risk  (12/13/2023)    Financial Resource Strain     Within the past 12 months, have you or your family members you live with been unable to get utilities (heat, electricity) when it was really needed?: No   Food Insecurity: Low Risk  (12/13/2023)    Food Insecurity     Within the past 12 months, did you worry that your food would run out before you got money to buy more?: No     Within the past 12 months, did the food you bought just not last and you didn t have money to get more?: No   Transportation Needs: High Risk (12/13/2023)    Transportation Needs     Within the past 12 months, has lack of transportation kept you from medical appointments, getting your medicines, non-medical meetings or appointments, work, or from getting things that you need?: Yes   Interpersonal Safety: Low Risk  (12/13/2023)    Interpersonal Safety     Do you feel physically and emotionally safe where you currently live?: Yes     Within the past 12 months, have you been hit, slapped, kicked or otherwise physically hurt by someone?: No     Within the past 12 months, have you been humiliated or emotionally abused in other ways by your partner or ex-partner?: No   Housing Stability: High Risk (12/13/2023)    Housing Stability     Do you have housing? : Yes     Are you worried about losing your housing?: Yes         Patient's past medical, surgical, social, and family histories were personally reviewed today and no changes are noted.  REVIEW OF SYSTEMS:  Review of  Systems    OBJECTIVE:  Vital signs as noted in EPIC for 2/14/2024    General: healthy, alert and in no distress  HEENT: no scleral icterus or conjunctival erythema  Skin: no suspicious lesions or rash. No jaundice.  CV: no pedal edema  Resp: normal respiratory effort without conversational dyspnea   Psych: normal mood and affect  Gait: normal steady gait with appropriate coordination and balance  Neuro: Normal light sensory exam of lower extremity      MSK:  Exam shows a pleasant 63-year-old female who ambulates full weightbearing without assistive device.  No antalgia.  Negative Trendelenburg gait.  Patient has full range of motion of the lumbar column without symptoms or pain.  No pain to palpation throughout the lumbosacral junction or paraspinous muscles of the lumbar column.  Straight leg raise is negative.  Patient has normal sensory dermatome pattern with equal sensation bilaterally including L2-S1 bilaterally.  Patient can walk on her toes and heels.  Normal DTRs symmetrically at the knees and ankles.  Patient has no pain with passive or active range of motion of the right hip with negative impingement testing currently.  Motor tone is 5 out of 5 all lower extremity motor groups.  No lower extremity edema.        Patient's conditions were thoroughly discussed during today's visit with total time reviewing patient's previous medical records/history/radiology, face-to-face examination and discussion and plan of care with the patient and documentation being 20 minutes.    Giovanni North PA-C  [facility]    This note was completed in part using a voice recognition software, any grammatical or context distortion are unintentional and inherent to the software.

## 2024-02-16 ENCOUNTER — OFFICE VISIT (OUTPATIENT)
Dept: PEDIATRICS | Facility: CLINIC | Age: 64
End: 2024-02-16
Attending: PHYSICIAN ASSISTANT
Payer: COMMERCIAL

## 2024-02-16 ENCOUNTER — ANCILLARY PROCEDURE (OUTPATIENT)
Dept: MAMMOGRAPHY | Facility: CLINIC | Age: 64
End: 2024-02-16
Attending: PHYSICIAN ASSISTANT
Payer: COMMERCIAL

## 2024-02-16 ENCOUNTER — TELEPHONE (OUTPATIENT)
Dept: PEDIATRICS | Facility: CLINIC | Age: 64
End: 2024-02-16

## 2024-02-16 VITALS
OXYGEN SATURATION: 95 % | HEART RATE: 66 BPM | DIASTOLIC BLOOD PRESSURE: 70 MMHG | HEIGHT: 64 IN | TEMPERATURE: 97.7 F | SYSTOLIC BLOOD PRESSURE: 100 MMHG | WEIGHT: 197.2 LBS | BODY MASS INDEX: 33.67 KG/M2 | RESPIRATION RATE: 20 BRPM

## 2024-02-16 DIAGNOSIS — Z12.31 VISIT FOR SCREENING MAMMOGRAM: ICD-10-CM

## 2024-02-16 DIAGNOSIS — K92.1 MELENA: ICD-10-CM

## 2024-02-16 DIAGNOSIS — Z13.1 SCREENING FOR DIABETES MELLITUS: ICD-10-CM

## 2024-02-16 DIAGNOSIS — D50.9 MICROCYTIC ANEMIA: Primary | ICD-10-CM

## 2024-02-16 DIAGNOSIS — E66.811 CLASS 1 OBESITY DUE TO EXCESS CALORIES WITHOUT SERIOUS COMORBIDITY WITH BODY MASS INDEX (BMI) OF 33.0 TO 33.9 IN ADULT: ICD-10-CM

## 2024-02-16 DIAGNOSIS — E66.09 CLASS 1 OBESITY DUE TO EXCESS CALORIES WITHOUT SERIOUS COMORBIDITY WITH BODY MASS INDEX (BMI) OF 33.0 TO 33.9 IN ADULT: ICD-10-CM

## 2024-02-16 DIAGNOSIS — Z00.00 ROUTINE GENERAL MEDICAL EXAMINATION AT A HEALTH CARE FACILITY: Primary | ICD-10-CM

## 2024-02-16 DIAGNOSIS — H54.7 DECREASED VISION: ICD-10-CM

## 2024-02-16 DIAGNOSIS — D50.9 MICROCYTIC ANEMIA: ICD-10-CM

## 2024-02-16 DIAGNOSIS — R10.13 EPIGASTRIC PAIN: ICD-10-CM

## 2024-02-16 LAB
ERYTHROCYTE [DISTWIDTH] IN BLOOD BY AUTOMATED COUNT: 20.2 % (ref 10–15)
HBA1C MFR BLD: 5.4 % (ref 0–5.6)
HCT VFR BLD AUTO: 41 % (ref 35–47)
HGB BLD-MCNC: 11.9 G/DL (ref 11.7–15.7)
MCH RBC QN AUTO: 23.2 PG (ref 26.5–33)
MCHC RBC AUTO-ENTMCNC: 29 G/DL (ref 31.5–36.5)
MCV RBC AUTO: 80 FL (ref 78–100)
PLATELET # BLD AUTO: 296 10E3/UL (ref 150–450)
RBC # BLD AUTO: 5.13 10E6/UL (ref 3.8–5.2)
WBC # BLD AUTO: 5.4 10E3/UL (ref 4–11)

## 2024-02-16 PROCEDURE — 83036 HEMOGLOBIN GLYCOSYLATED A1C: CPT | Performed by: PHYSICIAN ASSISTANT

## 2024-02-16 PROCEDURE — 85027 COMPLETE CBC AUTOMATED: CPT | Performed by: PHYSICIAN ASSISTANT

## 2024-02-16 PROCEDURE — 77067 SCR MAMMO BI INCL CAD: CPT | Mod: TC | Performed by: RADIOLOGY

## 2024-02-16 PROCEDURE — 36415 COLL VENOUS BLD VENIPUNCTURE: CPT | Performed by: PHYSICIAN ASSISTANT

## 2024-02-16 PROCEDURE — 90471 IMMUNIZATION ADMIN: CPT | Performed by: PHYSICIAN ASSISTANT

## 2024-02-16 PROCEDURE — 90686 IIV4 VACC NO PRSV 0.5 ML IM: CPT | Performed by: PHYSICIAN ASSISTANT

## 2024-02-16 PROCEDURE — 99396 PREV VISIT EST AGE 40-64: CPT | Mod: 25 | Performed by: PHYSICIAN ASSISTANT

## 2024-02-16 RX ORDER — PANTOPRAZOLE SODIUM 40 MG/1
40 TABLET, DELAYED RELEASE ORAL DAILY
Qty: 30 TABLET | Refills: 1 | Status: SHIPPED | OUTPATIENT
Start: 2024-02-16 | End: 2024-04-18

## 2024-02-16 RX ORDER — PANTOPRAZOLE SODIUM 40 MG/1
40 TABLET, DELAYED RELEASE ORAL DAILY
COMMUNITY
End: 2024-02-16

## 2024-02-16 SDOH — HEALTH STABILITY: PHYSICAL HEALTH: ON AVERAGE, HOW MANY DAYS PER WEEK DO YOU ENGAGE IN MODERATE TO STRENUOUS EXERCISE (LIKE A BRISK WALK)?: 2 DAYS

## 2024-02-16 SDOH — HEALTH STABILITY: PHYSICAL HEALTH: ON AVERAGE, HOW MANY MINUTES DO YOU ENGAGE IN EXERCISE AT THIS LEVEL?: 20 MIN

## 2024-02-16 ASSESSMENT — SOCIAL DETERMINANTS OF HEALTH (SDOH): HOW OFTEN DO YOU GET TOGETHER WITH FRIENDS OR RELATIVES?: TWICE A WEEK

## 2024-02-16 ASSESSMENT — PAIN SCALES - GENERAL: PAINLEVEL: NO PAIN (0)

## 2024-02-16 NOTE — PROGRESS NOTES
Preventive Care Visit  [facility] [place] Zayas PA-C, Physician Assistant  Feb 16, 2024    Assessment & Plan     Routine general medical examination at a health care facility  Schedule annual physical in one year.     Microcytic anemia  Has EGD scheduled. Asymptomatic. Was on PPI, but stopped. No further melena   - CBC with platelets  - CBC with platelets    Decreased vision    - Adult Eye  Referral    Screening for diabetes mellitus    - Hemoglobin A1c  - Hemoglobin A1c    Class 1 obesity due to excess calories without serious comorbidity with body mass index (BMI) of 33.0 to 33.9 in adult  Continue to work on heart healthy diet and exercise.               Counseling  Appropriate preventive services were discussed with this patient, including applicable screening as appropriate for fall prevention, nutrition, physical activity, Tobacco-use cessation, weight loss and cognition.  Checklist reviewing preventive services available has been given to the patient.  Reviewed patient's diet, addressing concerns and/or questions.   She is at risk for lack of exercise and has been provided with information to increase physical activity for the benefit of her well-being.             Luz Moreno is a 63 year old, presenting for the following:  Physical        2/16/2024     8:17 AM   Additional Questions   Roomed by Ct Michael   Accompanied by N/A         2/16/2024     8:17 AM   Patient Reported Additional Medications   Patient reports taking the following new medications No        Health Care Directive  Patient does not have a Health Care Directive or Living Will: Discussed advance care planning with patient; information given to patient to review.    HPI                2/16/2024   General Health   How would you rate your overall physical health? Good   Feel stress (tense, anxious, or unable to sleep) Not at all         2/16/2024   Nutrition   Three or more servings of calcium each day?  Yes   Diet: Regular (no restrictions)   How many servings of fruit and vegetables per day? (!) 0-1   How many sweetened beverages each day? 0-1         2/16/2024   Exercise   Days per week of moderate/strenous exercise 2 days   Average minutes spent exercising at this level 20 min   (!) EXERCISE CONCERN      2/16/2024   Social Factors   Frequency of gathering with friends or relatives Twice a week   Worry food won't last until get money to buy more No   Food not last or not have enough money for food? No   Do you have housing?  Yes   Are you worried about losing your housing? No   Lack of transportation? No   Unable to get utilities (heat,electricity)? No         2/16/2024   Fall Risk   Fallen 2 or more times in the past year? No   Trouble with walking or balance? No          2/16/2024   Dental   Dentist two times every year? Yes         2/16/2024   TB Screening   Were you born outside of [place]?  ([place] YES  [place]          Today's PHQ-2 Score:       2/16/2024     8:24 AM   PHQ-2 ( 1999 [employer])   Q1: Little interest or pleasure in doing things 2   Q2: Feeling down, depressed or hopeless 0   PHQ-2 Score 2   Q1: Little interest or pleasure in doing things More than half the days   Q2: Feeling down, depressed or hopeless Not at all   PHQ-2 Score 2           2/16/2024   Substance Use   Alcohol more than 3/day or more than 7/wk No   Do you use any other substances recreationally? No     Social History     Tobacco Use    Smoking status: Never    Smokeless tobacco: Never   Vaping Use    Vaping Use: Never used             2/16/2024   Breast Cancer Screening   Family history of breast, colon, or ovarian cancer? No / Unknown              2/16/2024   STI Screening   New sexual partner(s) since last STI/HIV test? No     History of abnormal Pap smear: Status post benign hysterectomy. Health Maintenance and Surgical History updated.       The 10-year ASCVD risk score (Elvira BOTELLO, et al., 2019) is: 2.3%    Values used to calculate the  "score:      Age: 63 years      Sex: Female      Is Non- : No      Diabetic: No      Tobacco smoker: No      Systolic Blood Pressure: 100 mmHg      Is BP treated: No      HDL Cholesterol: 52 mg/dL      Total Cholesterol: 191 mg/dL           Reviewed and updated as needed this visit by Provider     Meds  Problems  Med Hx  Surg Hx  Fam Hx                     Objective    Exam  /70 (BP Location: Right arm, Patient Position: Sitting, Cuff Size: Adult Large)   Pulse 66   Temp 97.7  F (36.5  C) (Tympanic)   Resp 20   Ht 1.626 m (5' 4\")   Wt 89.4 kg (197 lb 3.2 oz)   SpO2 95%   BMI 33.85 kg/m     Estimated body mass index is 33.85 kg/m  as calculated from the following:    Height as of this encounter: 1.626 m (5' 4\").    Weight as of this encounter: 89.4 kg (197 lb 3.2 oz).    Physical Exam  GENERAL: alert and no distress  EYES: Eyes grossly normal to inspection, PERRL and conjunctivae and sclerae normal  HENT: ear canals and TM's normal, nose and mouth without ulcers or lesions  NECK: no adenopathy, no asymmetry, masses, or scars  RESP: lungs clear to auscultation - no rales, rhonchi or wheezes  CV: regular rate and rhythm, normal S1 S2, no S3 or S4, no murmur, click or rub, no peripheral edema  ABDOMEN: soft, nontender, no hepatosplenomegaly, no masses and bowel sounds normal   (female): normal female external genitalia, normal urethral meatus , normal vaginal mucosa. No cervix present. ,   MS: no gross musculoskeletal defects noted, no edema  SKIN: no suspicious lesions or rashes  NEURO: Normal strength and tone, mentation intact and speech normal  PSYCH: mentation appears normal, affect normal/bright      Signed Electronically by: Lucila Zayas PA-C    "

## 2024-02-16 NOTE — PATIENT INSTRUCTIONS
Preventive Care Advice   This is general advice given by our system to help you stay healthy. However, your care team may have specific advice just for you. Please talk to your care team about your preventive care needs.  Nutrition  Eat 5 or more servings of fruits and vegetables each day.  Try wheat bread, brown rice and whole grain pasta (instead of white bread, rice, and pasta).  Get enough calcium and vitamin D. Check the label on foods and aim for 100% of the RDA (recommended daily allowance).  Lifestyle  Exercise at least 150 minutes each week  (30 minutes a day, 5 days a week).  Do muscle strengthening activities 2 days a week. These help control your weight and prevent disease.  No smoking.  Wear sunscreen to prevent skin cancer.  Have a dental exam and cleaning every 6 months.  Yearly exams  See your health care team every year to talk about:  Any changes in your health.  Any medicines your care team has prescribed.  Preventive care, family planning, and ways to prevent chronic diseases.  Shots (vaccines)   HPV shots (up to age 26), if you've never had them before.  Hepatitis B shots (up to age 59), if you've never had them before.  COVID-19 shot: Get this shot when it's due.  Flu shot: Get a flu shot every year.  Tetanus shot: Get a tetanus shot every 10 years.  Pneumococcal, hepatitis A, and RSV shots: Ask your care team if you need these based on your risk.  Shingles shot (for age 50 and up)  General health tests  Diabetes screening:  Starting at age 35, Get screened for diabetes at least every 3 years.  If you are younger than age 35, ask your care team if you should be screened for diabetes.  Cholesterol test: At age 39, start having a cholesterol test every 5 years, or more often if advised.  Bone density scan (DEXA): At age 50, ask your care team if you should have this scan for osteoporosis (brittle bones).  Hepatitis C: Get tested at least once in your life.  STIs (sexually transmitted  infections)  Before age 24: Ask your care team if you should be screened for STIs.  After age 24: Get screened for STIs if you're at risk. You are at risk for STIs (including HIV) if:  You are sexually active with more than one person.  You don't use condoms every time.  You or a partner was diagnosed with a sexually transmitted infection.  If you are at risk for HIV, ask about PrEP medicine to prevent HIV.  Get tested for HIV at least once in your life, whether you are at risk for HIV or not.  Cancer screening tests  Cervical cancer screening: If you have a cervix, begin getting regular cervical cancer screening tests starting at age 21.  Breast cancer scan (mammogram): If you've ever had breasts, begin having regular mammograms starting at age 40. This is a scan to check for breast cancer.  Colon cancer screening: It is important to start screening for colon cancer at age 45.  Have a colonoscopy test every 10 years (or more often if you're at risk) Or, ask your provider about stool tests like a FIT test every year or Cologuard test every 3 years.  To learn more about your testing options, visit:   https://www.[employer]/930598.pdf.  For help making a decision, visit:   https://bit.ly/eg08269.  Prostate cancer screening test: If you have a prostate, ask your care team if a prostate cancer screening test (PSA) at age 55 is right for you.  Lung cancer screening: If you are a current or former smoker ages 50 to 80, ask your care team if ongoing lung cancer screenings are right for you.  For informational purposes only. Not to replace the advice of your health care provider. Copyright   2023 [employer]. All rights reserved. Clinically reviewed by the [facility] Transitions Program. [place] [id number] - REV 01/24.

## 2024-02-20 ENCOUNTER — OFFICE VISIT (OUTPATIENT)
Dept: ORTHOPEDICS | Facility: CLINIC | Age: 64
End: 2024-02-20
Payer: COMMERCIAL

## 2024-02-20 VITALS — WEIGHT: 197 LBS | HEIGHT: 64 IN | BODY MASS INDEX: 33.63 KG/M2

## 2024-02-20 DIAGNOSIS — T39.395A GI BLEED DUE TO NSAIDS: ICD-10-CM

## 2024-02-20 DIAGNOSIS — M54.16 CHRONIC RADICULAR LOW BACK PAIN: Primary | ICD-10-CM

## 2024-02-20 DIAGNOSIS — G89.29 CHRONIC RADICULAR LOW BACK PAIN: Primary | ICD-10-CM

## 2024-02-20 DIAGNOSIS — M16.11 PRIMARY OSTEOARTHRITIS OF RIGHT HIP: ICD-10-CM

## 2024-02-20 DIAGNOSIS — K92.2 GI BLEED DUE TO NSAIDS: ICD-10-CM

## 2024-02-20 PROCEDURE — 99213 OFFICE O/P EST LOW 20 MIN: CPT | Performed by: PHYSICIAN ASSISTANT

## 2024-02-20 NOTE — LETTER
2/20/2024         RE: Tiffanie Pozo  [address]  [place] [address]        Dear Colleague,    Thank you for referring your patient, Tiffanie Pozo, to the [facility] [place]. Please see a copy of my visit note below.    ASSESSMENT & PLAN     Today we discussed the underlying etiology/pathology of patient's   1. Chronic radicular right low back pain- resolved 02/20/24    2. Primary osteoarthritis of right hip- improved with intra-articular cortisone injection 11/22/23    3. GI bleed due to NSAIDs      -We discussed the patient has no longer any low back pain or right lower extremity symptoms since being on gabapentin.  She has continue taking 300 mg tablet nightly.  - Patient is scheduled for upper and lower GI endoscopy in April 2024 due to melena with GI bleed secondary to NSAID use.  We reviewed her recent blood work which shows her hemoglobin has stabilized at 11.9  -Patient still should follow-up with GI as recommended for upper and lower scopes.  - Since patient is asymptomatic in regards to her low back and right leg radiculopathy she will discontinue gabapentin as of today.  If symptoms reappear patient will reengage for repeat assessment and discussion.    -Call direct clinic number [[phone]] at any time with questions or concerns in regards to your recent office visit with me.     Giovanni North PA-C  [facility]          SUBJECTIVE  Tiffanie Pozo is a/an 63 year old female who is seen for follow up of right low back pain with right lower extremity radiculopathy symptoms. The patient is seen by themselves.   Since last visit on 11/28/23 patient has complete improvement in pain and function.      Has previous treatment plan been beneficial for condition: yes   Location of Pain: right low back, lateral thigh and lower leg-fully resolved at this time  Rating of Pain at worst: 0/10  Rating of Pain Currently: 0/10  Worsened by: none,  some soreness after working in the house for a while  Better with: treatments tried-gabapentin 300 mg nightly and physical therapy x 2 session  Treatments tried: rest/activity avoidance, ice, and other medications: Gabapentin, PT (2 visits), home exercises from PT  Quality: soreness after working  Associated symptoms: no distal numbness or tingling; denies swelling or warmth      No past medical history on file.  Social History     Socioeconomic History     Marital status:    Tobacco Use     Smoking status: Never     Smokeless tobacco: Never   Vaping Use     Vaping Use: Never used   Social History Narrative        Lives alone    Work- variety of different work.      Social Determinants of Health     Financial Resource Strain: Low Risk  (12/13/2023)    Financial Resource Strain      Within the past 12 months, have you or your family members you live with been unable to get utilities (heat, electricity) when it was really needed?: No   Food Insecurity: Low Risk  (12/13/2023)    Food Insecurity      Within the past 12 months, did you worry that your food would run out before you got money to buy more?: No      Within the past 12 months, did the food you bought just not last and you didn t have money to get more?: No   Transportation Needs: High Risk (12/13/2023)    Transportation Needs      Within the past 12 months, has lack of transportation kept you from medical appointments, getting your medicines, non-medical meetings or appointments, work, or from getting things that you need?: Yes   Interpersonal Safety: Low Risk  (12/13/2023)    Interpersonal Safety      Do you feel physically and emotionally safe where you currently live?: Yes      Within the past 12 months, have you been hit, slapped, kicked or otherwise physically hurt by someone?: No      Within the past 12 months, have you been humiliated or emotionally abused in other ways by your partner or ex-partner?: No   Housing Stability: High Risk  (12/13/2023)    Housing Stability      Do you have housing? : Yes      Are you worried about losing your housing?: Yes         Patient's past medical, surgical, social, and family histories were personally reviewed today and no changes are noted.  REVIEW OF SYSTEMS:  Review of Systems    OBJECTIVE:  Vital signs as noted in EPIC for 2/14/2024    General: healthy, alert and in no distress  HEENT: no scleral icterus or conjunctival erythema  Skin: no suspicious lesions or rash. No jaundice.  CV: no pedal edema  Resp: normal respiratory effort without conversational dyspnea   Psych: normal mood and affect  Gait: normal steady gait with appropriate coordination and balance  Neuro: Normal light sensory exam of lower extremity      MSK:  Exam shows a pleasant 63-year-old female who ambulates full weightbearing without assistive device.  No antalgia.  Negative Trendelenburg gait.  Patient has full range of motion of the lumbar column without symptoms or pain.  No pain to palpation throughout the lumbosacral junction or paraspinous muscles of the lumbar column.  Straight leg raise is negative.  Patient has normal sensory dermatome pattern with equal sensation bilaterally including L2-S1 bilaterally.  Patient can walk on her toes and heels.  Normal DTRs symmetrically at the knees and ankles.  Patient has no pain with passive or active range of motion of the right hip with negative impingement testing currently.  Motor tone is 5 out of 5 all lower extremity motor groups.  No lower extremity edema.        Patient's conditions were thoroughly discussed during today's visit with total time reviewing patient's previous medical records/history/radiology, face-to-face examination and discussion and plan of care with the patient and documentation being 20 minutes.    Giovanni North PA-C  [facility]    This note was completed in part using a voice recognition software, any grammatical or context distortion are  unintentional and inherent to the software.       Again, thank you for allowing me to participate in the care of your patient.        Sincerely,        Giovanni North PA-C

## 2024-02-20 NOTE — PATIENT INSTRUCTIONS
Today we discussed the underlying etiology/pathology of patient's   1. Chronic radicular right low back pain- resolved 02/20/24    2. Primary osteoarthritis of right hip- improved with intra-articular cortisone injection 11/22/23    3. GI bleed due to NSAIDs      -We discussed the patient has no longer any low back pain or right lower extremity symptoms since being on gabapentin.  She has continue taking 300 mg tablet nightly.  - Patient is scheduled for upper and lower GI endoscopy in April 2024 due to melena with GI bleed secondary to NSAID use.  We reviewed her recent blood work which shows her hemoglobin has stabilized at 11.9  -Patient still should follow-up with GI as recommended for upper and lower scopes.  - Since patient is asymptomatic in regards to her low back and right leg radiculopathy she will discontinue gabapentin as of today.  If symptoms reappear patient will reengage for repeat assessment and discussion.    -Call direct clinic number [[phone]] at any time with questions or concerns in regards to your recent office visit with me.     Giovanni North PA-C  [facility]

## 2024-02-26 RX ORDER — GABAPENTIN 300 MG/1
300 CAPSULE ORAL 2 TIMES DAILY
Qty: 60 CAPSULE | Refills: 0 | OUTPATIENT
Start: 2024-02-26

## 2024-02-26 NOTE — TELEPHONE ENCOUNTER
Gabapentin 300mg was discontinued at 2/20/24 office visit due to resolution of low back pain and right lower extremity symptoms.     Refill denied.     KAHLIL Zhao RN

## 2024-03-25 ENCOUNTER — MYC MEDICAL ADVICE (OUTPATIENT)
Dept: PEDIATRICS | Facility: CLINIC | Age: 64
End: 2024-03-25
Payer: COMMERCIAL

## 2024-04-02 ENCOUNTER — TELEPHONE (OUTPATIENT)
Dept: GASTROENTEROLOGY | Facility: CLINIC | Age: 64
End: 2024-04-02
Payer: COMMERCIAL

## 2024-04-02 NOTE — TELEPHONE ENCOUNTER
Pre visit planning completed.      Procedure details:    Patient scheduled for Colonoscopy  on 4/18/24.     Arrival time: 0900. Procedure time 1000    Facility location: [facility]; [address] [place] [address], 5[address] Floor, [place] [address]. Check in location: 5th Floor.    Sedation type: MAC    Pre op exam needed? N/A    Indication for procedure:   Melena      Screening      Chart review:     Electronic implanted devices? No    Recent diagnosis of diverticulitis within the last 6 weeks? No    Diabetic? No      Medication review:    Anticoagulants? No    NSAIDS? No    Other medication HOLDING recommendations:  N/A      Prep for procedure:     Bowel prep recommendation: Standard Miralax  Due to: standard bowel prep.    Prep instructions sent via [employer]         Madie Johnston RN  Endoscopy Procedure Pre Assessment RN  [phone] option 4

## 2024-04-03 NOTE — TELEPHONE ENCOUNTER
ID [id number]    Pre assessment completed for upcoming procedure.      Procedure details:    Patient scheduled for Colonoscopy/Upper endoscopy (EGD) on 4.18.24.     Arrival time: 0900. Procedure time 1000    Facility location: [facility]; [address] [place] [address], [address] Floor, [place] [address]. Check in location: 5th Floor.    Sedation type: MAC    Pre op exam needed? N/A    Indication for procedure: anemia, Melena    Designated  policy reviewed. Instructed to have someone stay 24 hours post procedure.       Chart review:     Electronic implanted devices? No    Recent diagnosis of diverticulitis within the last 6 weeks?  No    Diabetic? No      Medication review:    Anticoagulants? No    NSAIDS? No    Other medication HOLDING recommendations:  N/A      Prep for procedure:     Bowel prep recommendation: Standard Miralax  Due to: standard bowel prep.    Prep instructions sent via [email or web address]     Reviewed procedure prep instructions. Writer spent greater than 30 minutes going over colonoscopy prep.     Patient states has a family member that can help translate Edgar instructions.    Patient verbalized understanding and had no questions or concerns at this time.        Maria E Ambrosio RN  Endoscopy Procedure Pre Assessment RN  [phone] option 4

## 2024-04-18 ENCOUNTER — OFFICE VISIT (OUTPATIENT)
Dept: INTERPRETER SERVICES | Facility: CLINIC | Age: 64
End: 2024-04-18

## 2024-04-18 ENCOUNTER — MYC REFILL (OUTPATIENT)
Dept: PEDIATRICS | Facility: CLINIC | Age: 64
End: 2024-04-18

## 2024-04-18 ENCOUNTER — HOSPITAL ENCOUNTER (OUTPATIENT)
Facility: AMBULATORY SURGERY CENTER | Age: 64
Discharge: HOME | End: 2024-04-18
Attending: INTERNAL MEDICINE | Admitting: INTERNAL MEDICINE
Payer: COMMERCIAL

## 2024-04-18 ENCOUNTER — ANESTHESIA EVENT (OUTPATIENT)
Dept: SURGERY | Facility: AMBULATORY SURGERY CENTER | Age: 64
End: 2024-04-18
Payer: COMMERCIAL

## 2024-04-18 ENCOUNTER — ANESTHESIA (OUTPATIENT)
Dept: SURGERY | Facility: AMBULATORY SURGERY CENTER | Age: 64
End: 2024-04-18
Payer: COMMERCIAL

## 2024-04-18 VITALS
RESPIRATION RATE: 16 BRPM | TEMPERATURE: 97.3 F | OXYGEN SATURATION: 98 % | HEART RATE: 69 BPM | BODY MASS INDEX: 30.1 KG/M2 | DIASTOLIC BLOOD PRESSURE: 68 MMHG | SYSTOLIC BLOOD PRESSURE: 109 MMHG | WEIGHT: 191.8 LBS | HEIGHT: 67 IN

## 2024-04-18 VITALS — HEART RATE: 73 BPM

## 2024-04-18 DIAGNOSIS — R10.13 EPIGASTRIC PAIN: ICD-10-CM

## 2024-04-18 LAB
COLONOSCOPY: NORMAL
UPPER GI ENDOSCOPY: NORMAL

## 2024-04-18 PROCEDURE — 45385 COLONOSCOPY W/LESION REMOVAL: CPT | Mod: 33 | Performed by: INTERNAL MEDICINE

## 2024-04-18 PROCEDURE — 45385 COLONOSCOPY W/LESION REMOVAL: CPT | Performed by: STUDENT IN AN ORGANIZED HEALTH CARE EDUCATION/TRAINING PROGRAM

## 2024-04-18 PROCEDURE — T1013 SIGN LANG/ORAL INTERPRETER: HCPCS | Mod: U3

## 2024-04-18 PROCEDURE — 88342 IMHCHEM/IMCYTCHM 1ST ANTB: CPT | Mod: 26 | Performed by: PATHOLOGY

## 2024-04-18 PROCEDURE — 88342 IMHCHEM/IMCYTCHM 1ST ANTB: CPT | Mod: TC | Performed by: INTERNAL MEDICINE

## 2024-04-18 PROCEDURE — 45385 COLONOSCOPY W/LESION REMOVAL: CPT | Performed by: NURSE ANESTHETIST, CERTIFIED REGISTERED

## 2024-04-18 PROCEDURE — 43239 EGD BIOPSY SINGLE/MULTIPLE: CPT | Performed by: INTERNAL MEDICINE

## 2024-04-18 PROCEDURE — 88305 TISSUE EXAM BY PATHOLOGIST: CPT | Mod: 26 | Performed by: PATHOLOGY

## 2024-04-18 RX ORDER — GLYCOPYRROLATE 0.2 MG/ML
INJECTION, SOLUTION INTRAMUSCULAR; INTRAVENOUS PRN
Status: DISCONTINUED | OUTPATIENT
Start: 2024-04-18 | End: 2024-04-18

## 2024-04-18 RX ORDER — NALOXONE HYDROCHLORIDE 0.4 MG/ML
0.2 INJECTION, SOLUTION INTRAMUSCULAR; INTRAVENOUS; SUBCUTANEOUS
Status: CANCELLED | OUTPATIENT
Start: 2024-04-18

## 2024-04-18 RX ORDER — ONDANSETRON 4 MG/1
4 TABLET, ORALLY DISINTEGRATING ORAL EVERY 6 HOURS PRN
Status: CANCELLED | OUTPATIENT
Start: 2024-04-18

## 2024-04-18 RX ORDER — PROPOFOL 10 MG/ML
INJECTION, EMULSION INTRAVENOUS PRN
Status: DISCONTINUED | OUTPATIENT
Start: 2024-04-18 | End: 2024-04-18

## 2024-04-18 RX ORDER — ONDANSETRON 2 MG/ML
4 INJECTION INTRAMUSCULAR; INTRAVENOUS EVERY 6 HOURS PRN
Status: CANCELLED | OUTPATIENT
Start: 2024-04-18

## 2024-04-18 RX ORDER — PROPOFOL 10 MG/ML
INJECTION, EMULSION INTRAVENOUS CONTINUOUS PRN
Status: DISCONTINUED | OUTPATIENT
Start: 2024-04-18 | End: 2024-04-18

## 2024-04-18 RX ORDER — ONDANSETRON 2 MG/ML
4 INJECTION INTRAMUSCULAR; INTRAVENOUS
Status: COMPLETED | OUTPATIENT
Start: 2024-04-18 | End: 2024-04-18

## 2024-04-18 RX ORDER — FLUMAZENIL 0.1 MG/ML
0.2 INJECTION, SOLUTION INTRAVENOUS
Status: CANCELLED | OUTPATIENT
Start: 2024-04-18 | End: 2024-04-18

## 2024-04-18 RX ORDER — LIDOCAINE HYDROCHLORIDE 20 MG/ML
INJECTION, SOLUTION INFILTRATION; PERINEURAL PRN
Status: DISCONTINUED | OUTPATIENT
Start: 2024-04-18 | End: 2024-04-18

## 2024-04-18 RX ORDER — NALOXONE HYDROCHLORIDE 0.4 MG/ML
0.4 INJECTION, SOLUTION INTRAMUSCULAR; INTRAVENOUS; SUBCUTANEOUS
Status: CANCELLED | OUTPATIENT
Start: 2024-04-18

## 2024-04-18 RX ORDER — PROCHLORPERAZINE MALEATE 10 MG
10 TABLET ORAL EVERY 6 HOURS PRN
Status: CANCELLED | OUTPATIENT
Start: 2024-04-18

## 2024-04-18 RX ORDER — SODIUM CHLORIDE, SODIUM LACTATE, POTASSIUM CHLORIDE, CALCIUM CHLORIDE 600; 310; 30; 20 MG/100ML; MG/100ML; MG/100ML; MG/100ML
INJECTION, SOLUTION INTRAVENOUS CONTINUOUS
Status: DISCONTINUED | OUTPATIENT
Start: 2024-04-18 | End: 2024-04-19 | Stop reason: HOSPADM

## 2024-04-18 RX ORDER — LIDOCAINE 40 MG/G
CREAM TOPICAL
Status: DISCONTINUED | OUTPATIENT
Start: 2024-04-18 | End: 2024-04-19 | Stop reason: HOSPADM

## 2024-04-18 RX ADMIN — ONDANSETRON 4 MG: 2 INJECTION INTRAMUSCULAR; INTRAVENOUS at 09:40

## 2024-04-18 RX ADMIN — PROPOFOL 100 MG: 10 INJECTION, EMULSION INTRAVENOUS at 09:40

## 2024-04-18 RX ADMIN — SODIUM CHLORIDE, SODIUM LACTATE, POTASSIUM CHLORIDE, CALCIUM CHLORIDE: 600; 310; 30; 20 INJECTION, SOLUTION INTRAVENOUS at 09:33

## 2024-04-18 RX ADMIN — LIDOCAINE HYDROCHLORIDE 100 MG: 20 INJECTION, SOLUTION INFILTRATION; PERINEURAL at 09:40

## 2024-04-18 RX ADMIN — GLYCOPYRROLATE 0.2 MG: 0.2 INJECTION, SOLUTION INTRAMUSCULAR; INTRAVENOUS at 09:40

## 2024-04-18 RX ADMIN — PROPOFOL 250 MCG/KG/MIN: 10 INJECTION, EMULSION INTRAVENOUS at 09:40

## 2024-04-18 NOTE — ANESTHESIA PREPROCEDURE EVALUATION
"Anesthesia Pre-Procedure Evaluation    Patient: Tiffanie Pozo   MRN: [id number] : 1960        Procedure : Procedure(s):  Colonoscopy  Esophagoscopy, gastroscopy, duodenoscopy (EGD), combined          No past medical history on file.   Past Surgical History:   Procedure Laterality Date    HYSTERECTOMY, PAP NO LONGER INDICATED            No Known Allergies   Social History     Tobacco Use    Smoking status: Never    Smokeless tobacco: Never   Substance Use Topics    Alcohol use: Not on file      Wt Readings from Last 1 Encounters:   24 89.4 kg (197 lb)           Physical Exam    Airway        Mallampati: II   TM distance: > 3 FB   Neck ROM: full   Mouth opening: > 3 cm    Respiratory Devices and Support         Dental       (+) Minor Abnormalities - some fillings, tiny chips      Cardiovascular   cardiovascular exam normal          Pulmonary   pulmonary exam normal                OUTSIDE LABS:  CBC:   Lab Results   Component Value Date    WBC 5.4 2024    WBC 4.4 2023    HGB 11.9 2024    HGB 8.0 (L) 2023    HCT 41.0 2024    HCT 27.4 (L) 2023     2024     2023     BMP: No results found for: \"NA\", \"POTASSIUM\", \"CHLORIDE\", \"CO2\", \"BUN\", \"CR\", \"GLC\"  COAGS: No results found for: \"PTT\", \"INR\", \"FIBR\"  POC: No results found for: \"BGM\", \"HCG\", \"HCGS\"  HEPATIC: No results found for: \"ALBUMIN\", \"PROTTOTAL\", \"ALT\", \"AST\", \"GGT\", \"ALKPHOS\", \"BILITOTAL\", \"BILIDIRECT\", \"STEPHANIE\"  OTHER:   Lab Results   Component Value Date    A1C 5.4 2024       Anesthesia Plan    ASA Status:  2    NPO Status:  NPO Appropriate    Anesthesia Type: MAC.     - Reason for MAC: straight local not clinically adequate   Induction: Intravenous, Propofol.   Maintenance: TIVA.        Consents    Anesthesia Plan(s) and associated risks, benefits, and realistic alternatives discussed. Questions answered and patient/representative(s) expressed understanding.     - " Discussed: Risks, Benefits and Alternatives for BOTH SEDATION and the PROCEDURE were discussed     - Discussed with:  Patient,       - Extended Intubation/Ventilatory Support Discussed: No.      - Patient is DNR/DNI Status: No     Use of blood products discussed: No .     Postoperative Care       PONV prophylaxis: Ondansetron (or other 5HT-3), Background Propofol Infusion     Comments:               Kenneth Maldonado MD    I have reviewed the pertinent notes and labs in the chart from the past 30 days and (re)examined the patient.  Any updates or changes from those notes are reflected in this note.

## 2024-04-18 NOTE — H&P
"Tiffanie Pozo  [id number]  female  63 year old    Reason for procedure/surgery: EGD + Colonoscopy + Rectal exam for melena + CRC screening. No fmhx of CRC    Patient Active Problem List   Diagnosis    Chronic bilateral low back pain with right-sided sciatica       Past Surgical History:    Past Surgical History:   Procedure Laterality Date    HYSTERECTOMY, PAP NO LONGER INDICATED      2012- [place]       Past Medical History: No past medical history on file.    Social History:   Social History     Tobacco Use    Smoking status: Never    Smokeless tobacco: Never   Substance Use Topics    Alcohol use: Not on file       Family History:   Family History   Problem Relation Age of Onset    Diabetes Sister     No Known Problems Sister     No Known Problems Brother        Allergies: No Known Allergies    Active Medications:   Current Outpatient Medications   Medication Sig Dispense Refill    pantoprazole (PROTONIX) 40 MG EC tablet Take 1 tablet (40 mg) by mouth daily 30 tablet 1       Systemic Review:   CONSTITUTIONAL: NEGATIVE for fever, chills, change in weight  ENT/MOUTH: NEGATIVE for ear, mouth and throat problems  RESP: NEGATIVE for significant cough or SOB  CV: NEGATIVE for chest pain, palpitations or peripheral edema    Physical Examination:   Vital Signs: /82 (BP Location: Right arm)   Pulse 74   Temp 97.6  F (36.4  C) (Temporal)   Resp 18   Ht 1.702 m (5' 7\")   Wt 87 kg (191 lb 12.8 oz)   SpO2 96%   BMI 30.04 kg/m    GENERAL: healthy, alert and no distress  NECK: no adenopathy, no asymmetry, masses, or scars  RESP: lungs clear to auscultation - no rales, rhonchi or wheezes  CV: regular rate and rhythm, normal S1 S2, no S3 or S4, no murmur, click or rub, no peripheral edema and peripheral pulses strong  ABDOMEN: soft, nontender, no hepatosplenomegaly, no masses and bowel sounds normal  MS: no gross musculoskeletal defects noted, no edema    Plan: Appropriate to proceed as scheduled.      Giselle" Wally Lindsay MD  4/18/2024    PCP:  Lucila Zayas     [FreeTextEntry1] : Mr. Denis is  a 73 year old male presented with urinary urgency, erectile dysfunction, and history of kidney stones. Patient was seen by me 4 years ago. Noted he had acute flank pain that resolved on its own a few weeks ago. Complained that when he drinks fluids he will have urinary urgency but denied urge incontinence. Patient denies dysuria, gross hematuria, or incontinence.  I advised the patient to increase his water intake for his kidney stones. Complained of erectile dysfunction, erections are not adequate for penetration. Retired. Former smoker, 2 packs daily. \par 8/28/18: The patient returned and reported his urination has improved since taking tamsulosin. Noted he has had been having abdominal pain since starting tamsulosin. We will switch over to Alfuzosin. Denied any nocturia. PSA from 8/7/18 was 2.39 ng/mL.\par 9/24/18: The patient returned and reported Alfuzosin did not improve his urination. Noted he wanted to try tamsulosin again. Discontinued use before due to upset stomach but noted upset stomach may have been from changing his diet and not the tamsulosin. Complained of decreased sexual desire. Testosterone from 8/7/18 was 362.4 ng/dL. 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 We will check hormone levels today. \par 10/16/18: The patient returned today and reported urination has improved since taking Tamsulosin and denies any side effects. Testosterone, adrenal hormones, THS is normal. The patient denies urinary urgency. Wakes up 0-1 time during the night to urinate. \par \par 6/18/19: Patient presents today for a follow up. Today he reports that his urination is excellent. Tamsulosin 0.4 mg is taken as directed. He is able to take 2 hour drives without having to worry. He was also able to consume a few glasses of beer at a ball game without having to urinate. He is not able to achieve erections and is considering beginning medication at this time.

## 2024-04-18 NOTE — ANESTHESIA CARE TRANSFER NOTE
Patient: Tiffanie Pozo    Procedure: Procedure(s):  COLONOSCOPY, WITH POLYPECTOMY  ESOPHAGOGASTRODUODENOSCOPY, WITH BIOPSY       Diagnosis: Screen for colon cancer [Z12.11]  Melena [K92.1]  Diagnosis Additional Information: No value filed.    Anesthesia Type:   MAC     Note:    Oropharynx: oropharynx clear of all foreign objects and spontaneously breathing  Level of Consciousness: awake  Oxygen Supplementation: room air    Independent Airway: airway patency satisfactory and stable  Dentition: dentition unchanged  Vital Signs Stable: post-procedure vital signs reviewed and stable  Report to RN Given: handoff report given  Patient transferred to: Phase II    Handoff Report: Identifed the Patient, Identified the Reponsible Provider, Reviewed the pertinent medical history, Discussed the surgical course, Reviewed Intra-OP anesthesia mangement and issues during anesthesia, Set expectations for post-procedure period and Allowed opportunity for questions and acknowledgement of understanding      Vitals:  Vitals Value Taken Time   BP     Temp     Pulse     Resp     SpO2         Electronically Signed By: PHOENIX Maldonado CRNA  April 18, 2024  10:18 AM

## 2024-04-18 NOTE — ANESTHESIA POSTPROCEDURE EVALUATION
Patient: Tiffanie Pozo    Procedure: Procedure(s):  COLONOSCOPY, WITH POLYPECTOMY  ESOPHAGOGASTRODUODENOSCOPY, WITH BIOPSY       Anesthesia Type:  MAC    Note:  Disposition: Outpatient   Postop Pain Control: Uneventful            Sign Out: Well controlled pain   PONV: No   Neuro/Psych: Uneventful            Sign Out: Acceptable/Baseline neuro status   Airway/Respiratory: Uneventful            Sign Out: Acceptable/Baseline resp. status   CV/Hemodynamics: Uneventful            Sign Out: Acceptable CV status; No obvious hypovolemia; No obvious fluid overload   Other NRE:    DID A NON-ROUTINE EVENT OCCUR?            Last vitals:  Vitals Value Taken Time   /72 04/18/24 1015   Temp 36.1  C (97  F) 04/18/24 1012   Pulse 67 04/18/24 1015   Resp 16 04/18/24 1015   SpO2 97 % 04/18/24 1015       Electronically Signed By: Kenneth Maldonado MD  April 18, 2024  11:16 AM

## 2024-04-19 RX ORDER — PANTOPRAZOLE SODIUM 40 MG/1
40 TABLET, DELAYED RELEASE ORAL DAILY
Qty: 30 TABLET | Refills: 1 | Status: SHIPPED | OUTPATIENT
Start: 2024-04-19 | End: 2024-04-20

## 2024-04-20 DIAGNOSIS — A04.8 H. PYLORI INFECTION: Primary | ICD-10-CM

## 2024-04-20 LAB
PATH REPORT.COMMENTS IMP SPEC: NORMAL
PATH REPORT.COMMENTS IMP SPEC: NORMAL
PATH REPORT.FINAL DX SPEC: NORMAL
PATH REPORT.GROSS SPEC: NORMAL
PATH REPORT.MICROSCOPIC SPEC OTHER STN: NORMAL
PATH REPORT.RELEVANT HX SPEC: NORMAL
PHOTO IMAGE: NORMAL

## 2024-04-20 RX ORDER — TETRACYCLINE HYDROCHLORIDE 500 MG/1
500 CAPSULE ORAL 4 TIMES DAILY
Qty: 56 CAPSULE | Refills: 0 | Status: SHIPPED | OUTPATIENT
Start: 2024-04-20 | End: 2024-05-04

## 2024-04-20 RX ORDER — BISMUTH SUBSALICYLATE 262 MG/1
2 TABLET, CHEWABLE ORAL
Qty: 112 TABLET | Refills: 0 | Status: SHIPPED | OUTPATIENT
Start: 2024-04-20 | End: 2024-05-04

## 2024-04-20 RX ORDER — METRONIDAZOLE 250 MG/1
500 TABLET ORAL 4 TIMES DAILY
Qty: 112 TABLET | Refills: 0 | Status: SHIPPED | OUTPATIENT
Start: 2024-04-20 | End: 2024-05-04

## 2024-04-23 ENCOUNTER — APPOINTMENT (OUTPATIENT)
Dept: INTERPRETER SERVICES | Facility: CLINIC | Age: 64
End: 2024-04-23
Payer: COMMERCIAL

## 2024-04-23 ENCOUNTER — TELEPHONE (OUTPATIENT)
Dept: GASTROENTEROLOGY | Facility: CLINIC | Age: 64
End: 2024-04-23
Payer: COMMERCIAL

## 2024-04-23 NOTE — TELEPHONE ENCOUNTER
Returned call to pharmacy.  Some of patient's prescriptions for H. Pylori are not covered.  Do we want to re evaluate or start PA:  TETRACYCLINE Not covered.  PA OR switch to something else.   Pepto bismol. Has limits but can purchase OTC or PA?   Omeprazole - only covered for one a day. Can either purchase or submit PA OTC?   Metronidazole - no issues w/insurance.    Pharmacy noted patient was taking pantoprazole.  Could she take one of the pantoprazole and one omeprazole?     Addendum:   Let  pharmacy know patient will be set up to see [facility] pharmacist to discuss H. Pylori treatment and they can figure out which medications are approved at that time.    Sheyla LEWIS LPN  [facility]atology Clinic    [facility]    Phone Message    May a detailed message be left on voicemail: yes     Reason for Call: Other: Received call from  Pharmacy in [place] asking for a call back, as they received multiple orders for the Pt but some of these medication are not covered by the Pt's insurance and they would like to discuss alternate options. Please call back to discuss.     Action Taken: Message routed to:  [facility] Hep    Travel Screening: Not Applicable

## 2024-05-02 ENCOUNTER — PATIENT OUTREACH (OUTPATIENT)
Dept: GASTROENTEROLOGY | Facility: CLINIC | Age: 64
End: 2024-05-02
Payer: COMMERCIAL

## 2024-05-06 ENCOUNTER — VIRTUAL VISIT (OUTPATIENT)
Dept: GASTROENTEROLOGY | Facility: CLINIC | Age: 64
End: 2024-05-06
Attending: INTERNAL MEDICINE
Payer: COMMERCIAL

## 2024-05-06 DIAGNOSIS — A04.8 H. PYLORI INFECTION: Primary | ICD-10-CM

## 2024-05-06 PROCEDURE — T1013 SIGN LANG/ORAL INTERPRETER: HCPCS | Mod: U4,TEL,95

## 2024-05-06 RX ORDER — METRONIDAZOLE 250 MG/1
250 TABLET ORAL 4 TIMES DAILY
Qty: 56 TABLET | Refills: 0 | Status: SHIPPED | OUTPATIENT
Start: 2024-05-06

## 2024-05-06 RX ORDER — OMEPRAZOLE 40 MG/1
40 CAPSULE, DELAYED RELEASE ORAL DAILY
Qty: 14 CAPSULE | Refills: 0 | Status: SHIPPED | OUTPATIENT
Start: 2024-05-06

## 2024-05-06 RX ORDER — METRONIDAZOLE 250 MG/1
250 TABLET ORAL 4 TIMES DAILY
Qty: 56 TABLET | Refills: 0 | Status: SHIPPED | OUTPATIENT
Start: 2024-05-06 | End: 2024-05-06

## 2024-05-06 RX ORDER — BISMUTH SUBSALICYLATE 262 MG/1
2 TABLET, CHEWABLE ORAL
Qty: 112 TABLET | Refills: 0 | Status: SHIPPED | OUTPATIENT
Start: 2024-05-06

## 2024-05-06 RX ORDER — OMEPRAZOLE 40 MG/1
40 CAPSULE, DELAYED RELEASE ORAL DAILY
Qty: 14 CAPSULE | Refills: 0 | Status: SHIPPED | OUTPATIENT
Start: 2024-05-06 | End: 2024-05-06

## 2024-05-06 RX ORDER — DOXYCYCLINE HYCLATE 100 MG
100 TABLET ORAL 2 TIMES DAILY
Qty: 28 TABLET | Refills: 0 | Status: SHIPPED | OUTPATIENT
Start: 2024-05-06 | End: 2024-05-06

## 2024-05-06 RX ORDER — BISMUTH SUBSALICYLATE 262 MG/1
2 TABLET, CHEWABLE ORAL
Qty: 112 TABLET | Refills: 0 | Status: SHIPPED | OUTPATIENT
Start: 2024-05-06 | End: 2024-05-06

## 2024-05-06 RX ORDER — DOXYCYCLINE HYCLATE 100 MG
100 TABLET ORAL 2 TIMES DAILY
Qty: 28 TABLET | Refills: 0 | Status: SHIPPED | OUTPATIENT
Start: 2024-05-06

## 2024-05-06 NOTE — PROGRESS NOTES
Medication Therapy Management (MTM) Encounter    ASSESSMENT:                            Medication Adherence/Access: See below for considerations    H pylori: Agree with previous recommendation to pursue bismuth quadruple therapy as first-line regimen for treatment of H pylori. Will plan to substitute doxycycline in place of tetracycline due to coverage. Will also increase omeprazole dose to 40 mg and dose once daily per insurance limitation. Pepto bismol is available over-the-counter, and she is okay purchasing it this way, so we will plan for that. No previous issues with metronidazole, but will re-order this so everything is together. We reviewed that she can either start treatment before she leaves, or we can treat when she gets back from [facility]. Discussed challenge with starting when she gets to [facility] is that we cannot advise her from another country/change therapy if needed. We also reviewed that it is very possible she could get re-exposed in [facility]. Given she preferred not to delay treatment, will plan on starting treatment now and check-in next week. If she is unable to tolerate treatment, will defer until she gets back from [facility]. If she is able to tolerate treatment, we will plan on eradication testing when she gets back. She is aware that if she remains positive, we will be unable to distinguish resistance for re-exposure -- but we would still pursue re-treatment. She expresses understanding of this.    PLAN:                            Tiffanie to start bismuth quadruple therapy x 14 days:  -- omeprazole 40 mg by mouth daily  -- pepto bismol 524 mg by mouth four times daily  -- doxycycline 100 mg by mouth twice daily   -- metronidazole 250 mg by mouth four times daily     Follow-up:    -- next week for telephone call on 6/13 at 2:30 PM   -- at least four weeks after treatment for re-check (after returning from [facility])    EDUCATION:                            We reviewed H pylori today including  background information, indications for treatment and potential modes of transmission. We also discussed potential for antibiotic resistance and importance of finishing treatment if able, as well as follow-up testing. Discussed bismuth quadruple therapy today including medications, dosing, side effects, precautions and general counseling information. Discussed the potential for bismuth to cause dark tongue/stools. Contact information provided in the event she has questions or concerns regarding treatment.    SUBJECTIVE/OBJECTIVE:                          Tiffanie Pozo is a 63 year old female called for an initial visit. She was referred to me from Dr. Lindsay.   Contacted with an [employer] interpreter.     Reason for visit: H pylori treatment     Allergies/ADRs: None  Tobacco: She reports that she has never smoked. She has never used smokeless tobacco.  Alcohol: none  -- no current medications, notes she takes vitamin D/B sometimes    Medication Adherence/Access:   -- she notes that she was waiting to hear from someone about treatment and was never notified by the pharmacy that the treatment was ready    H pylori:   No current therapy    Tiffanie was prescribed bismuth quadruple therapy after her recent scope on 4/18 which was H pylori positive on biopsy. Notes prior to this she has never been diagnosed with H pylori, nor does she have any close contacts who are known to have H pylori. Notes she was previously taking pantoprazole twice daily. She states she does not have very many of these left. They were prescribing this before her endoscopy, but not anymore. She has about four tablets left. She notes that she is traveling to [facility] on 5/16 and won't be back until June. She does prefer to start before she leaves, but then asks if she can wait to take these until she gets back. She is not interested in delaying treatment until she gets back. She is fine with purchasing pepto bismol over the counter for  treatment. She prefers [facility] in [place] for orders.    Per chart notes from previously prescribed regimen:  -- tetracycline: not covered  -- omeprazole: qty limit to one per day  -- pepto bismol: qty limit  -- metronidazole: no issues    Findings:       The esophagus was normal.        The Z-line was regular and was found 39 cm from the incisors.        Localized mildly erythematous mucosa without bleeding was found in the        prepyloric region of the stomach. Biopsies were taken with a cold        forceps for Helicobacter pylori testing. Otherwise normal appearing        stomach.        The cardia and gastric fundus were normal on retroflexion.        The examined duodenum was normal. Biopsies for histology were taken with        a cold forceps for evaluation of celiac disease.     ----------------    I spent 38 minutes with this patient today. All changes were made via collaborative practice agreement with Giselle Lindsay. A copy of the visit note was provided to the patient's provider(s).    A summary of these recommendations was declined by the patient.    Lisa FieldsD, BCACP  MTM Pharmacist   [facility]   Phone: [phone]    Telemedicine Visit Details  Type of service:  Telephone visit  Start Time:  3:03 AM  End Time: 3:41 PM     Medication Therapy Recommendations  H. pylori infection    Current Medication: omeprazole (PRILOSEC) 20 MG DR capsule ()   Rationale: Medication product not available - Adherence - Adherence   Recommendation: Change Medication   Status: Accepted per CPA

## 2024-05-06 NOTE — Clinical Note
5/6/2024         RE: Tiffanie Pozo  [address] [place] [address]  [place] [address]        Dear Colleague,    Thank you for referring your patient, Tiffanie Pozo, to the [facility]. Please see a copy of my visit note below.    Medication Therapy Management (MTM) Encounter    ASSESSMENT:                            Medication Adherence/Access: See below for considerations    H pylori:       PLAN:                            Tiffanie to start bismuth quadruple therapy x 14 days:  -- omeprazole 40 mg by mouth daily  -- pepto bismol 524 mg by mouth four times daily  -- doxycycline 100 mg by mouth twice daily   -- metronidazole 250 mg by mouth four times daily     Follow-up:    -- next week for telephone call on 6/13 at 2:30 PM   -- at least four weeks after treatment for re-check (after returning from [facility])    SUBJECTIVE/OBJECTIVE:                          Tiffanie Pozo is a 63 year old female called for an initial visit. She was referred to me from Dr. Lindsay.   Contacted with an [employer] .     Reason for visit: H pylori treatment     Allergies/ADRs: None  Tobacco: She reports that she has never smoked. She has never used smokeless tobacco.  Alcohol: none    Medication Adherence/Access:   -- she notes that she was waiting to hear from someone about treatment and     H pylori:     Tiffanie was prescribed bismuth quadruple therapy after her recent scope suggesting. Notes prior to this she has never been diagnosed with H pylori. Notes she was previously taking pantoprazole twice daily. She states she does not have very many of these left. They were prescribing this before her endoscopy, but not anymore. She has about four tablets left. She notes that she is traveling to [facility] on 5/16 and won't be back until June. She does prefer to start before she leaves, but then asks     Findings:       The esophagus was normal.        The Z-line was regular and was found 39 cm from  the incisors.        Localized mildly erythematous mucosa without bleeding was found in the        prepyloric region of the stomach. Biopsies were taken with a cold        forceps for Helicobacter pylori testing. Otherwise normal appearing        stomach.        The cardia and gastric fundus were normal on retroflexion.        The examined duodenum was normal. Biopsies for histology were taken with        a cold forceps for evaluation of celiac disease.     Today's Vitals: There were no vitals taken for this visit.  ----------------  {MARY?:944241}    I spent 38 minutes with this patient today. { :[id number]}. A copy of the visit note was provided to the patient's provider(s).    A summary of these recommendations {GIVEN/NOT GIVEN:257023}.    Lisa Ascencio PharmD, BCACP  MTM Pharmacist   [facility]   Phone: [phone]    Telemedicine Visit Details  Type of service:  Telephone visit  Start Time:  3:03 AM  End Time: 3:41 PM     Medication Therapy Recommendations  No medication therapy recommendations to display         Again, thank you for allowing me to participate in the care of your patient.        Sincerely,        Lisa Ascencio [facility]

## 2024-07-18 ENCOUNTER — TELEPHONE (OUTPATIENT)
Dept: GASTROENTEROLOGY | Facility: CLINIC | Age: 64
End: 2024-07-18
Payer: COMMERCIAL

## 2024-09-12 ENCOUNTER — TELEPHONE (OUTPATIENT)
Dept: GASTROENTEROLOGY | Facility: CLINIC | Age: 64
End: 2024-09-12
Payer: COMMERCIAL

## 2024-09-13 NOTE — TELEPHONE ENCOUNTER
Tiffanie returned my call and left a message with her name. Returned call with a , and left an additional voicemail for call back.

## 2024-09-25 ENCOUNTER — OFFICE VISIT (OUTPATIENT)
Dept: PEDIATRICS | Facility: CLINIC | Age: 64
End: 2024-09-25
Payer: COMMERCIAL

## 2024-09-25 ENCOUNTER — APPOINTMENT (OUTPATIENT)
Dept: INTERPRETER SERVICES | Facility: CLINIC | Age: 64
End: 2024-09-25
Payer: COMMERCIAL

## 2024-09-25 VITALS
HEART RATE: 66 BPM | BODY MASS INDEX: 32.11 KG/M2 | RESPIRATION RATE: 18 BRPM | HEIGHT: 67 IN | DIASTOLIC BLOOD PRESSURE: 85 MMHG | TEMPERATURE: 97 F | WEIGHT: 204.6 LBS | OXYGEN SATURATION: 98 % | SYSTOLIC BLOOD PRESSURE: 126 MMHG

## 2024-09-25 DIAGNOSIS — M25.532 PAIN IN BOTH WRISTS: ICD-10-CM

## 2024-09-25 DIAGNOSIS — M19.041 OSTEOARTHRITIS OF BOTH HANDS, UNSPECIFIED OSTEOARTHRITIS TYPE: ICD-10-CM

## 2024-09-25 DIAGNOSIS — M25.531 PAIN IN BOTH WRISTS: ICD-10-CM

## 2024-09-25 DIAGNOSIS — A04.8 H. PYLORI INFECTION: Primary | ICD-10-CM

## 2024-09-25 DIAGNOSIS — M19.042 OSTEOARTHRITIS OF BOTH HANDS, UNSPECIFIED OSTEOARTHRITIS TYPE: ICD-10-CM

## 2024-09-25 PROCEDURE — 90673 RIV3 VACCINE NO PRESERV IM: CPT | Performed by: INTERNAL MEDICINE

## 2024-09-25 PROCEDURE — T1013 SIGN LANG/ORAL INTERPRETER: HCPCS | Mod: U4

## 2024-09-25 PROCEDURE — 91320 SARSCV2 VAC 30MCG TRS-SUC IM: CPT | Performed by: INTERNAL MEDICINE

## 2024-09-25 PROCEDURE — 99213 OFFICE O/P EST LOW 20 MIN: CPT | Mod: 25 | Performed by: INTERNAL MEDICINE

## 2024-09-25 PROCEDURE — 90471 IMMUNIZATION ADMIN: CPT | Performed by: INTERNAL MEDICINE

## 2024-09-25 PROCEDURE — 90480 ADMN SARSCOV2 VAC 1/ONLY CMP: CPT | Performed by: INTERNAL MEDICINE

## 2024-09-25 ASSESSMENT — PAIN SCALES - GENERAL: PAINLEVEL: NO PAIN (0)

## 2024-09-25 NOTE — PATIENT INSTRUCTIONS
Wear wrist braces as much as you are able during the day.  After 1 month, if symptoms persist, come back in.

## 2024-09-25 NOTE — PROGRESS NOTES
"  Assessment & Plan     H. pylori infection  Requesting repeat h pylori testing. Her previous GI symptoms have resolved  - Helicobacter pylori Antigen Stool; Future    Osteoarthritis of both hands, unspecified osteoarthritis type  Discussed conservative symptom management    Pain in both wrists  Likely overuse injury. No history of trauma. Plan trial using wrist braces  - Wrist/Arm/Hand Bracing Supplies Order Wrist Brace; Bilateral; non-thumb spica  - Physical Therapy  Referral; Future      Post Medication Reconciliation Status: discharge medications reconciled, continue medications without change      See Patient Instructions    Luz Moreno is a 64 year old, presenting for the following health issues:  Follow Up (H pylori )    Due to language barrier, an  was present by phone during the history-taking, subsequent discussion and physical exam with this patient.        9/25/2024     1:14 PM   Additional Questions   Roomed by Kamille Villar   Accompanied by N/A     HPI     Went to have an endocosopy done and later found bacteria - pt states she is still having bloating, reports no problems with BM      Pt states she was not admitted to hospital or seen in ED/UC    Had colonoscopy and found h pylori. Got treatment in May. Prior to treatment was experiencing epigastric stomach pain.     Having pain in hands and wrists. Feels like joints. Comes and goes. No stiffness. Does repetetive works sometimes at [employer]. Not every day.           Review of Systems  Constitutional, HEENT, cardiovascular, pulmonary, gi and gu systems are negative, except as otherwise noted.      Objective    /85 (BP Location: Right arm, Patient Position: Sitting, Cuff Size: Adult Large)   Pulse 66   Temp 97  F (36.1  C) (Temporal)   Resp 18   Ht 1.702 m (5' 7\")   Wt 92.8 kg (204 lb 9.6 oz)   LMP  (LMP Unknown)   SpO2 98%   BMI 32.04 kg/m    Body mass index is 32.04 kg/m .  Physical Exam   GENERAL: alert and no " distress  MS: bilateral wrists negative Tinel and Phalen tests. Mild changes of osteoarthriits PIP and DIP joints bilaterally.     Lab on 09/25/2024   Component Date Value Ref Range Status    Helicobacter pylori Antigen Stool 09/26/2024 Negative  Negative Final    Negative for Helicobacter pylori antigen by enzyme immunoassay. A negative result indicates the absence of H. pylori antigen or that the level of antigen is below the level of detection.           Signed Electronically by: Maritza Trevino MD

## 2025-03-16 ENCOUNTER — HEALTH MAINTENANCE LETTER (OUTPATIENT)
Age: 65
End: 2025-03-16

## (undated) DEVICE — SUCTION MANIFOLD NEPTUNE 2 SYS 1 PORT 702-025-000

## (undated) DEVICE — TUBING SUCTION MEDI-VAC 1/4"X20' N620A

## (undated) DEVICE — GOWN IMPERVIOUS 2XL BLUE

## (undated) DEVICE — ENDO BITE BLOCK ADULT OMNI-BLOC

## (undated) DEVICE — ENDO TRAP POLYP E-TRAP 00711099

## (undated) DEVICE — SYR 30ML SLIP TIP W/O NDL 302833

## (undated) DEVICE — SOL WATER IRRIG 500ML BOTTLE 2F7113

## (undated) DEVICE — KIT ENDO FIRST STEP DISINFECTANT 200ML W/POUCH EP-4

## (undated) DEVICE — ENDO SNARE EXACTO COLD 9MM LOOP 2.4MMX230CM 00711115

## (undated) DEVICE — KIT ENDO TURNOVER/PROCEDURE CARRY-ON 101822

## (undated) DEVICE — SPECIMEN CONTAINER 3OZ W/FORMALIN 59901

## (undated) DEVICE — SUCTION CATH AIRLIFE TRI-FLO W/CONTROL PORT 14FR  T60C